# Patient Record
Sex: FEMALE | Race: ASIAN | NOT HISPANIC OR LATINO | Employment: UNEMPLOYED | URBAN - METROPOLITAN AREA
[De-identification: names, ages, dates, MRNs, and addresses within clinical notes are randomized per-mention and may not be internally consistent; named-entity substitution may affect disease eponyms.]

---

## 2017-01-02 ENCOUNTER — OFFICE VISIT (OUTPATIENT)
Dept: FAMILY MEDICINE | Facility: CLINIC | Age: 47
End: 2017-01-02

## 2017-01-02 VITALS
OXYGEN SATURATION: 97 % | HEART RATE: 74 BPM | DIASTOLIC BLOOD PRESSURE: 74 MMHG | WEIGHT: 147.4 LBS | TEMPERATURE: 98.2 F | SYSTOLIC BLOOD PRESSURE: 114 MMHG | BODY MASS INDEX: 25.48 KG/M2

## 2017-01-02 DIAGNOSIS — K21.9 GASTROESOPHAGEAL REFLUX DISEASE WITHOUT ESOPHAGITIS: Primary | ICD-10-CM

## 2017-01-02 RX ORDER — CALCIUM CARBONATE 500 MG/1
1 TABLET, CHEWABLE ORAL DAILY
Qty: 150 TABLET | Refills: 3 | Status: SHIPPED | OUTPATIENT
Start: 2017-01-02 | End: 2017-01-06

## 2017-01-02 NOTE — NURSING NOTE
name: Josh (Xavier) Kelly  Language: Danna  Agency: [employer]  Phone number: [phone]

## 2017-01-02 NOTE — PATIENT INSTRUCTIONS
Take the TUMS before you eat instead of after.    Continue with the stool softener.  Okay to take 2.    Continue with fruits and vegetables.  Rejoin the gym.

## 2017-01-02 NOTE — MR AVS SNAPSHOT
After Visit Summary   1/2/2017    Saman Enrique    MRN: [id number]           Patient Information     Date Of Birth          1970        Visit Information        Provider Department      1/2/2017 2:10 PM Nellie Harman MD [facility]        Today's Diagnoses     Gastroesophageal reflux disease without esophagitis    -  1       Care Instructions    Take the TUMS before you eat instead of after.    Continue with the stool softener.  Okay to take 2.    Continue with fruits and vegetables.  Rejoin the gym.          Follow-ups after your visit        Who to contact     Please call your clinic at [phone] to:    Ask questions about your health    Make or cancel appointments    Discuss your medicines    Learn about your test results    Speak to your doctor   If you have compliments or concerns about an experience at your clinic, or if you wish to file a complaint, please contact [facility] Patient Relations at [phone] or email us at [email or web address]         Additional Information About Your Visit        Your Vitals Were     Pulse Temperature Pulse Oximetry Last Period          74 98.2  F (36.8  C) (Oral) 97% 01/18/2015 (Approximate)         Blood Pressure from Last 3 Encounters:   01/02/17 114/74   10/20/16 110/72   08/17/16 126/81    Weight from Last 3 Encounters:   01/02/17 147 lb 6.4 oz (66.86 kg)   10/20/16 144 lb 6.4 oz (65.499 kg)   08/17/16 145 lb 6.4 oz (65.953 kg)              Today, you had the following     No orders found for display         Today's Medication Changes          These changes are accurate as of: 1/2/17  2:37 PM.  If you have any questions, ask your nurse or doctor.               Stop taking these medicines if you haven't already. Please contact your care team if you have questions.     Simethicone 125 MG Caps   Stopped by:  Nellie Harman MD                Where to get your medicines      These medications were sent to  [facility] - [place], [place] - [address], [place] [address]     Phone:  [phone]    - calcium carbonate 500 MG chewable tablet             Primary Care Provider Office Phone # Fax #    Nellie Harman [phone]       [facility] [address] [place] [address]        Thank you!     Thank you for choosing [facility]  for your care. Our goal is always to provide you with excellent care. Hearing back from our patients is one way we can continue to improve our services. Please take a few minutes to complete the written survey that you may receive in the mail after your visit with us. Thank you!             Your Updated Medication List - Protect others around you: Learn how to safely use, store and throw away your medicines at www.disposemymeds.org.          This list is accurate as of: 1/2/17  2:37 PM.  Always use your most recent med list.                   Brand Name Dispense Instructions for use    acetaminophen 325 MG tablet    TYLENOL    90 tablet    Take 1-2 tablets (325-650 mg) by mouth every 6 hours as needed for pain       calcium carbonate 500 MG chewable tablet    TUMS    150 tablet    Take 1 tablet (500 mg) by mouth daily       naproxen 375 MG tablet    NAPROSYN    60 tablet    Take 1 tablet (375 mg) by mouth 2 times daily (with meals) Take together with ranitidine       ranitidine 150 MG tablet    ZANTAC    60 tablet    Take 2 tablets (300 mg) by mouth At Bedtime Take 2 tablets daily with naproxen       senna 8.6 MG tablet    SENOKOT    60 tablet    Take 1 tablet by mouth 2 times daily       vitamin D 2000 UNITS tablet     100 tablet    Take 2,000 Units by mouth daily

## 2017-01-03 NOTE — PROGRESS NOTES
SUBJECTIVE:  This is a 47-year-old female who is seen today with the assistance of an , Josh Mendoza, for evaluation of continued abdominal pain.  She says the pain in her right side is somewhat better.  She still gets pain on the right side in the lower back, but she feels that the medication helps.  She can't remember which medication it is, but she says it is a small, red-brown pill, which sounds most likely to be naproxen.  She continues to feel that her abdomen is bloated and has too much gas.  This is mostly on the right side, but sometimes it is on the left.  She feels she is always taking medications.  She has to take the gas and burning medications on a regular basis.  Right now, she takes one stool softener (which appears to be Senna) and one calcium carbonate daily.  She also takes this if she has trouble with heartburn.  She says symptoms are particularly bothersome with cilantro and spicy foods.  She drinks water with lime or lemon in it and this helps to calm the symptoms.  She feel like she shouldn't have to take as many medications.  She worries that she could have cancer or something else that causes the discomfort.       She eats lots of fruits and vegetables to help with symptoms.  They discontinued their gym membership because of cost, but she is interested in getting into a lower cost gym option, as she feels better and has improvement in the symptoms when she exercises.     OBJECTIVE:   VITAL SIGNS:  Reviewed.  They are within the normal range.   GENERAL:  Comfortable-appearing, Danna female in no acute distress.   ABDOMEN:  Belly is soft and nontender with palpation.  Bowel sounds are present and normoactive.  No CVA tenderness.     ASSESSMENT AND PLAN:  A 47-year-old female presenting for followup of abdominal pain and discomfort.  I continue to think this is more of a functional abdominal pain.  She'll benefit from healthy eating and exercise.  It is okay to increase the Tums tablets  and Senna, to twice daily as these are helpful.   Right upper quadrant pain.  This is most likely secondary to the lipoma, although it is still an odd presentation.  The naproxen is helpful.  I don't think there is anything more concerning going on and it doesn't interfere with function.  We'll continue to monitor symptoms.     Nellie Harman

## 2017-01-06 ENCOUNTER — TELEPHONE (OUTPATIENT)
Dept: FAMILY MEDICINE | Facility: CLINIC | Age: 47
End: 2017-01-06

## 2017-01-06 DIAGNOSIS — K21.9 GASTROESOPHAGEAL REFLUX DISEASE WITHOUT ESOPHAGITIS: Primary | ICD-10-CM

## 2017-01-06 RX ORDER — CALCIUM CARBONATE 500 MG/1
1 TABLET, CHEWABLE ORAL DAILY
Qty: 150 TABLET | Refills: 3 | Status: SHIPPED | OUTPATIENT
Start: 2017-01-06 | End: 2017-01-17

## 2017-01-17 DIAGNOSIS — K21.9 GASTROESOPHAGEAL REFLUX DISEASE WITHOUT ESOPHAGITIS: Primary | ICD-10-CM

## 2017-01-17 RX ORDER — CALCIUM CARBONATE 500 MG/1
1 TABLET, CHEWABLE ORAL 2 TIMES DAILY
Qty: 180 TABLET | Refills: 3 | Status: SHIPPED | OUTPATIENT
Start: 2017-01-17 | End: 2018-06-25

## 2017-01-20 DIAGNOSIS — G56.01 CARPAL TUNNEL SYNDROME OF RIGHT WRIST: Primary | ICD-10-CM

## 2017-03-23 DIAGNOSIS — K59.00 CONSTIPATION, UNSPECIFIED CONSTIPATION TYPE: ICD-10-CM

## 2017-03-23 RX ORDER — SENNOSIDES A AND B 8.6 MG/1
1 TABLET, FILM COATED ORAL 2 TIMES DAILY
Qty: 60 TABLET | Refills: 3 | Status: SHIPPED | OUTPATIENT
Start: 2017-03-23 | End: 2017-05-18

## 2017-03-28 ENCOUNTER — RECORDS - HEALTHEAST (OUTPATIENT)
Dept: MAMMOGRAPHY | Facility: CLINIC | Age: 47
End: 2017-03-28

## 2017-03-28 DIAGNOSIS — Z12.31 ENCOUNTER FOR SCREENING MAMMOGRAM FOR MALIGNANT NEOPLASM OF BREAST: ICD-10-CM

## 2017-05-18 ENCOUNTER — OFFICE VISIT (OUTPATIENT)
Dept: FAMILY MEDICINE | Facility: CLINIC | Age: 47
End: 2017-05-18

## 2017-05-18 VITALS
WEIGHT: 149 LBS | DIASTOLIC BLOOD PRESSURE: 78 MMHG | SYSTOLIC BLOOD PRESSURE: 117 MMHG | BODY MASS INDEX: 25.44 KG/M2 | HEART RATE: 79 BPM | HEIGHT: 64 IN

## 2017-05-18 DIAGNOSIS — S93.431A SPRAIN OF TIBIOFIBULAR LIGAMENT OF RIGHT ANKLE, INITIAL ENCOUNTER: Primary | ICD-10-CM

## 2017-05-18 DIAGNOSIS — G89.29 CHRONIC RIGHT SHOULDER PAIN: ICD-10-CM

## 2017-05-18 DIAGNOSIS — M25.511 CHRONIC RIGHT SHOULDER PAIN: ICD-10-CM

## 2017-05-18 DIAGNOSIS — K59.00 CONSTIPATION, UNSPECIFIED CONSTIPATION TYPE: ICD-10-CM

## 2017-05-18 RX ORDER — SENNOSIDES A AND B 8.6 MG/1
1 TABLET, FILM COATED ORAL 2 TIMES DAILY
Qty: 60 TABLET | Refills: 5 | Status: SHIPPED | OUTPATIENT
Start: 2017-05-18 | End: 2018-06-25

## 2017-05-18 NOTE — PATIENT INSTRUCTIONS
Use the ice pack on the ankle and back.    Use the ace wrap on the ankle when moving around and at night.    Wear good supportive shoes!  Keep the ankle and the right shoulder moving--will help with stiffness.    Topical cream.  Put on ankle and shoulder blade up to 4x per day as needed.

## 2017-05-18 NOTE — PROGRESS NOTES
"    Nursing Notes:   Aruna, Whitley A, [facility]  5/18/2017 10:09 AM  Signed   name: Josh Mendoza (Htoo)  Language: Danna  Agency: [employer]  Phone number: [phone]  Chief Complaint   Patient presents with     RECHECK     Followup     Blood pressure 117/78, pulse 79, height 5' 3.75\" (161.9 cm), weight 149 lb (67.6 kg), last menstrual period 01/18/2015.    SUBJECTIVE:  This is a 47-year-old female, well-known to me, w/history of chronic right-sided body, neck, and arm pain, intermittent abnormal thyroid, fatty liver disease, and vitamin D deficiency.  She reports that she has good days and bad days.  She continues to have right-sided tightness and tenderness.  She tries to do exercises to keep the shoulder and neck moving because she is afraid it will get worse.  She worries that this will turn into a stroke.  She gets massage at home and this is helpful.  She also takes some Tylenol and uses some tiger balm, and that has been somewhat helpful for her.  We discussed further options today and she would like to try a different cream, but also she would be interested in trying OMT for this.     She is having acute right ankle pain.  She had an episode a couple of days ago where she was walking with a bunch of laundry in her hand and she missed a step.  She felt her right ankle invert and she had pain initially with that.  Now, it is uncomfortable when she is walking.  Movement is difficult.  She has had some bruising and swelling as well.  She has been using tiger balm and Tylenol for this.   Bowels have been better.  She is using Senna, two pills daily in the morning, and it keeps her stools soft.  She was on this today.     Senna has been helping with her constipation.  No loose stools.  No bleeding.  Less abdominal pain when stools are normal.      OBJECTIVE:   VITAL SIGNS:  Reviewed.  Blood pressure is within the normal range.  Weight is stable.   GENERAL:  Comfortable-appearing, Danna female in no acute " distress.   ABDOMEN:  Belly is soft, nontender, and nondistended.   EXTREMITIES:  Right ankle has some mild swelling.  No warmth.  Bruising present just below the lateral malleolus, as well as along the heel.  ROM is normal.  No instability.  No pain with palpation over the medial or lateral malleolus.  No pain with foot squeeze or calf squeeze.  Negative for corner x-ray, per [place] ankle rules.   MUSCULOSKELETAL:  She has right-sided paraspinous muscle tenderness, as well as tenderness underneath the left shoulder blade.  This extends up into the strap muscles of the neck and down toward the deltoid and extending toward her shoulder.     ASSESSMENT/PLAN:  This is a 47-year-old female presenting for routine followup, as well as acute ankle injury.   1.  Right-sided ankle sprain.  She is tender over the atlf joint.  There is mild swelling.  I gave her an Ace wrap and recommended regular ice.  She can continue to take Tylenol and use tiger balm.  She would like a different topical, so I also prescribed Voltaren gel.  We've been trying to avoid an NSAID, as it irritates her stomach.  I recommended doing ROM exercises.   2.  Chronic right-sided upper back and shoulder pain.  This continues to cause her some trouble, but it isn't limiting her activities.  There is paraspinous muscle tenderness on exam.  We'll refer her for OMT.  Continue using Tylenol.  Continue to use Voltaren on this area, as well as the ice pack.   3.  Chronic constipation.  I refilled her Senna.     She'll follow up with me in two months for recheck.  We'll also schedule OMT visit with one of our osteopathic providers here in clinic.     Nellie Harman          Patient Instructions   Use the ice pack on the ankle and back.    Use the ace wrap on the ankle when moving around and at night.    Wear good supportive shoes!  Keep the ankle and the right shoulder moving--will help with stiffness.    Topical cream.  Put on ankle and shoulder blade up to  4x per day as needed.

## 2017-05-18 NOTE — MR AVS SNAPSHOT
After Visit Summary   2017    Saman Enrique    MRN: [id number]           Patient Information     Date Of Birth          1970        Visit Information        Provider Department      2017 10:00 AM Nellie Harman MD [facility]        Today's Diagnoses     Sprain of tibiofibular ligament of right ankle, initial encounter    -  1    Constipation, unspecified constipation type          Care Instructions    Use the ice pack on the ankle and back.    Use the ace wrap on the ankle when moving around and at night.    Wear good supportive shoes!  Keep the ankle and the right shoulder moving--will help with stiffness.    Topical cream.  Put on ankle and shoulder blade up to 4x per day as needed.          Follow-ups after your visit        Who to contact     Please call your clinic at [phone] to:    Ask questions about your health    Make or cancel appointments    Discuss your medicines    Learn about your test results    Speak to your doctor   If you have compliments or concerns about an experience at your clinic, or if you wish to file a complaint, please contact [facility] Patient Relations at [phone] or email us at [email or web address]         Additional Information About Your Visit        MyChart Information     P[employer] is an electronic gateway that provides easy, online access to your medical records. With [employer], you can request a clinic appointment, read your test results, renew a prescription or communicate with your care team.     To sign up for [employer] visit the website at [email or web address]   You will be asked to enter the access code listed below, as well as some personal information. Please follow the directions to create your username and password.     Your access code is: [email or web address]  Expires: 2017 10:30 AM     Your access code will  in 90 days. If you need help or a new code, please contact your [facility]  [facility] [facility] or call [phone] for assistance.        Care EveryWhere ID     This is your Care EveryWhere ID. This could be used by other organizations to access your [place] medical records  [id number]        Your Vitals Were     Pulse Height Last Period BMI (Body Mass Index)          79 5' 3.75\" (161.9 cm) 01/18/2015 (Approximate) 25.78 kg/m2         Blood Pressure from Last 3 Encounters:   05/18/17 117/78   01/02/17 114/74   10/20/16 110/72    Weight from Last 3 Encounters:   05/18/17 149 lb (67.6 kg)   01/02/17 147 lb 6.4 oz (66.9 kg)   10/20/16 144 lb 6.4 oz (65.5 kg)              We Performed the Following     ACE WRAP          Today's Medication Changes          These changes are accurate as of: 5/18/17 10:30 AM.  If you have any questions, ask your nurse or doctor.               Start taking these medicines.        Dose/Directions    diclofenac 1 % Gel topical gel   Commonly known as:  VOLTAREN   Used for:  Sprain of tibiofibular ligament of right ankle, initial encounter   Started by:  Nellie Harman MD        Apply 4 grams to right ankle four times daily using enclosed dosing card.   Quantity:  100 g   Refills:  1            Where to get your medicines      These [facility] [place], [place] - [address], [place] [address]     Phone:  [phone]     diclofenac 1 % Gel topical gel    senna 8.6 MG tablet                Primary Care Provider Office Phone # Fax #    Nellie Harman [phone]       [facility] [address] [place] [address]        Thank you!     Thank you for choosing [facility]  for your care. Our goal is always to provide you with excellent care. Hearing back from our patients is one way we can continue to improve our services. Please take a few minutes to complete the written survey that you may receive in the mail after your visit with us. Thank you!             Your " Updated Medication List - Protect others around you: Learn how to safely use, store and throw away your medicines at www.disposemymeds.org.          This list is accurate as of: 5/18/17 10:30 AM.  Always use your most recent med list.                   Brand Name Dispense Instructions for use    acetaminophen 325 MG tablet    TYLENOL    90 tablet    Take 1-2 tablets (325-650 mg) by mouth every 6 hours as needed for pain       calcium carbonate 500 MG chewable tablet    TUMS    180 tablet    Take 1 tablet (500 mg) by mouth 2 times daily       diclofenac 1 % Gel topical gel    VOLTAREN    100 g    Apply 4 grams to right ankle four times daily using enclosed dosing card.       GABAPENTIN PO          naproxen 375 MG tablet    NAPROSYN    60 tablet    Take 1 tablet (375 mg) by mouth 2 times daily (with meals) Take together with ranitidine       ranitidine 150 MG tablet    ZANTAC    60 tablet    Take 2 tablets (300 mg) by mouth At Bedtime Take 2 tablets daily with naproxen       senna 8.6 MG tablet    SENOKOT    60 tablet    Take 1 tablet by mouth 2 times daily       vitamin D 2000 UNITS tablet     100 tablet    Take 2,000 Units by mouth daily

## 2017-09-10 ENCOUNTER — HEALTH MAINTENANCE LETTER (OUTPATIENT)
Age: 47
End: 2017-09-10

## 2017-10-09 ENCOUNTER — COMMUNICATION - HEALTHEAST (OUTPATIENT)
Dept: ADMINISTRATIVE | Facility: CLINIC | Age: 47
End: 2017-10-09

## 2017-10-11 ENCOUNTER — AMBULATORY - HEALTHEAST (OUTPATIENT)
Dept: REGISTRATION | Facility: CLINIC | Age: 47
End: 2017-10-11

## 2017-12-01 ENCOUNTER — ALLIED HEALTH/NURSE VISIT (OUTPATIENT)
Dept: FAMILY MEDICINE | Facility: CLINIC | Age: 47
End: 2017-12-01

## 2017-12-01 DIAGNOSIS — Z23 NEED FOR IMMUNIZATION AGAINST INFLUENZA: Primary | ICD-10-CM

## 2017-12-01 NOTE — MR AVS SNAPSHOT
After Visit Summary   2017    Saman Enrique    MRN: [id number]           Patient Information     Date Of Birth          1970        Visit Information        Provider Department      2017 3:10 PM [facility]        Today's Diagnoses     Need for immunization against influenza    -  1       Follow-ups after your visit        Who to contact     Please call your clinic at [phone] to:    Ask questions about your health    Make or cancel appointments    Discuss your medicines    Learn about your test results    Speak to your doctor   If you have compliments or concerns about an experience at your clinic, or if you wish to file a complaint, please contact [facility] Patient Relations at [phone] or email us at [email or web address]         Additional Information About Your Visit        MyChart Information     Lingoda is an electronic gateway that provides easy, online access to your medical records. With [employer], you can request a clinic appointment, read your test results, renew a prescription or communicate with your care team.     To sign up for [employer] visit the website at [email or web address]   You will be asked to enter the access code listed below, as well as some personal information. Please follow the directions to create your username and password.     Your access code is: [email or web address]  Expires: 3/1/2018  4:31 PM     Your access code will  in 90 days. If you need help or a new code, please contact your [facility] or call [phone] for assistance.        Care EveryWhere ID     This is your Care EveryWhere ID. This could be used by other organizations to access your [place] medical records  [id number]        Your Vitals Were     Last Period                   2015 (Approximate)            Blood Pressure from Last 3 Encounters:   17 117/78   17 114/74   10/20/16  110/72    Weight from Last 3 Encounters:   05/18/17 149 lb (67.6 kg)   01/02/17 147 lb 6.4 oz (66.9 kg)   10/20/16 144 lb 6.4 oz (65.5 kg)              We Performed the Following     ADMIN VACCINE, INITIAL     FLU VAC QUADRIVLENT SPLIT VIRUS IM 0.5ml dosage        Primary Care Provider Office Phone # Fax #    Nellie Harman [phone]       [facility] [address] [place] [address]        Equal Access to Services     MORRO NUGENT : Hadii aad ku hadasho Soomaali, waaxda luqadaha, qaybta kaalmada adeegyada, waxay amanda hayravi wolfe . So [facility] [place] [facility] [phone].    ATENCIÓN: Si habla español, tiene a miranda disposición servicios gratuitos de asistencia lingüística. Llame al [phone].    We comply with applicable federal civil rights laws and [place] laws. We do not discriminate on the basis of race, color, national origin, age, disability, sex, sexual orientation, or gender identity.            Thank you!     Thank you for choosing [facility]  for your care. Our goal is always to provide you with excellent care. Hearing back from our patients is one way we can continue to improve our services. Please take a few minutes to complete the written survey that you may receive in the mail after your visit with us. Thank you!             Your Updated Medication List - Protect others around you: Learn how to safely use, store and throw away your medicines at www.disposemymeds.org.          This list is accurate as of: 12/1/17  4:31 PM.  Always use your most recent med list.                   Brand Name Dispense Instructions for use Diagnosis    acetaminophen 325 MG tablet    TYLENOL    90 tablet    Take 1-2 tablets (325-650 mg) by mouth every 6 hours as needed for pain    Preventative health care       calcium carbonate 500 MG chewable tablet    TUMS    180 tablet    Take 1 tablet (500 mg) by mouth 2 times daily    Gastroesophageal reflux disease without esophagitis        capsaicin 0.035 % Crea     1 Tube    Externally apply 1 Application topically 4 times daily as needed    Chronic right shoulder pain       GABAPENTIN PO           naproxen 375 MG tablet    NAPROSYN    60 tablet    Take 1 tablet (375 mg) by mouth 2 times daily (with meals) Take together with ranitidine    Carpal tunnel syndrome of right wrist       ranitidine 150 MG tablet    ZANTAC    60 tablet    Take 2 tablets (300 mg) by mouth At Bedtime Take 2 tablets daily with naproxen    Gastroesophageal reflux disease, esophagitis presence not specified       senna 8.6 MG tablet    SENOKOT    60 tablet    Take 1 tablet by mouth 2 times daily    Constipation, unspecified constipation type       vitamin D 2000 UNITS tablet     100 tablet    Take 2,000 Units by mouth daily    Preventative health care

## 2018-06-25 ENCOUNTER — RECORDS - HEALTHEAST (OUTPATIENT)
Dept: ADMINISTRATIVE | Facility: OTHER | Age: 48
End: 2018-06-25

## 2018-06-25 ENCOUNTER — OFFICE VISIT (OUTPATIENT)
Dept: FAMILY MEDICINE | Facility: CLINIC | Age: 48
End: 2018-06-25
Payer: COMMERCIAL

## 2018-06-25 VITALS
BODY MASS INDEX: 25.85 KG/M2 | TEMPERATURE: 98.5 F | DIASTOLIC BLOOD PRESSURE: 76 MMHG | OXYGEN SATURATION: 99 % | HEART RATE: 68 BPM | WEIGHT: 149.4 LBS | SYSTOLIC BLOOD PRESSURE: 113 MMHG

## 2018-06-25 DIAGNOSIS — R52 PAIN OF RIGHT SIDE OF BODY: Primary | ICD-10-CM

## 2018-06-25 DIAGNOSIS — K59.00 CONSTIPATION, UNSPECIFIED CONSTIPATION TYPE: ICD-10-CM

## 2018-06-25 DIAGNOSIS — K21.9 GASTROESOPHAGEAL REFLUX DISEASE, ESOPHAGITIS PRESENCE NOT SPECIFIED: ICD-10-CM

## 2018-06-25 DIAGNOSIS — K76.0 HEPATIC STEATOSIS: ICD-10-CM

## 2018-06-25 LAB
% GRANULOCYTES: 59.4 %G (ref 40–75)
ALBUMIN SERPL-MCNC: 4.2 MG/DL (ref 3.9–5.1)
ALP SERPL-CCNC: 90.7 U/L (ref 40–150)
ALT SERPL-CCNC: <15 U/L (ref 0–45)
AST SERPL-CCNC: 24.8 U/L (ref 0–45)
BILIRUB SERPL-MCNC: 0.3 MG/DL (ref 0.2–1.3)
BILIRUBIN DIRECT: 0.1 MG/DL (ref 0–0.2)
GRANULOCYTES #: 3.8 K/UL (ref 1.6–8.3)
HCT VFR BLD AUTO: 40.7 % (ref 35–47)
HEMOGLOBIN: 12.7 G/DL (ref 11.7–15.7)
LIPASE SERPL-CCNC: 38 U/L (ref 0–52)
LYMPHOCYTES # BLD AUTO: 2.1 K/UL (ref 0.8–5.3)
LYMPHOCYTES NFR BLD AUTO: 32.2 %L (ref 20–48)
MCH RBC QN AUTO: 30.1 PG (ref 26.5–35)
MCHC RBC AUTO-ENTMCNC: 31.2 G/DL (ref 32–36)
MCV RBC AUTO: 96.4 FL (ref 78–100)
MID #: 0.5 K/UL (ref 0–2.2)
MID %: 8.4 %M (ref 0–20)
PLATELET # BLD AUTO: 255 K/UL (ref 150–450)
PROT SERPL-MCNC: 7 G/DL (ref 6.8–8.8)
RBC # BLD AUTO: 4.2 M/UL (ref 3.8–5.2)
WBC # BLD AUTO: 6.4 K/UL (ref 4–11)

## 2018-06-25 RX ORDER — ACETAMINOPHEN 325 MG/1
650 TABLET ORAL EVERY 6 HOURS PRN
Qty: 100 TABLET | Refills: 0 | Status: SHIPPED | OUTPATIENT
Start: 2018-06-25 | End: 2019-11-19

## 2018-06-25 RX ORDER — POLYETHYLENE GLYCOL 3350 17 G/17G
1 POWDER, FOR SOLUTION ORAL DAILY
Qty: 510 G | Refills: 1 | Status: SHIPPED | OUTPATIENT
Start: 2018-06-25 | End: 2019-11-19

## 2018-06-25 NOTE — NURSING NOTE
Due to patient being non-English speaking/uses sign language, an  was used for this visit. Only for face-to-face interpretation by an external agency, date and length of interpretation can be found on the scanned worksheet.     name: Lety Rooney  Agency: [employer]  Language: Danna   Telephone number: [phone]  Type of interpretation: Face-to-face, spoken

## 2018-06-25 NOTE — PROGRESS NOTES
Subjective:    Saman Enrique is a 48 year old female who presents for evaluation of pain on the right side of her body x 2 weeks. She describes this as a burning sensation. It is located from head to toe. She can't think of any area that it originates from. She thinks this started about two years ago and it comes and goes. She can't think of any inciting event or injury. She has tried different medications over the years for this and thinks naproxen has worked the best. She stopped taking it because the pain got better. She didn't realize it was something you could take on an ongoing basis. She denies any weakness or difficulty walking. She has not noticed any rashes.     She also notes epigastric and RUQ abdominal pain. This has also been going on for a few years. She was on some medications for GERD and constipation she says, but again since they improved her symptoms she stopped them. She is not currently taking any medications at all. She notes regular stools, but says they are harder then she would like so wonders if she could be constipated.    ROS:   General: Denies fevers, chills.  Skin: Denies new rashes or lesions.  HEENT: +headache.  MSK: +pain right side of body.    Objective:    /76  Pulse 68  Temp 98.5  F (36.9  C) (Oral)  Wt 149 lb 6.4 oz (67.8 kg)  LMP 01/18/2015 (Approximate)  SpO2 99%  BMI 25.85 kg/m2    Physical Exam:  General: NAD.  Skin: No rashes or lesions visualized.  HEENT: PERRLA, EOMI.  Heart: Regular rhythm, no murmurs.  Lungs: Clear to auscultation b/l, no wheezes, crackles, or rales.  Abdomen: No distension. No tenderness to palpation. Negative green's sign. No hepatomegaly on palpation.   MSK: Normal gait.  Lymphatic: No swelling seen.  Neurologic: CN II-XII grossly intact, +2/4 reflexes bilaterally, symmetric sensation, 5/5 muscle strength bilaterally.    Assessment/Plan:    Saman was seen today for pain, headache and medication reconciliation.    Diagnoses and all orders for this  visit:    Pain of right side of body  Hard to know exactly what is causing this pain, odd that it is the entire right side of her body. She has no tenderness to palpation of her spine, but I do wonder about a cervical disc or nerve root compression. Will get an MRI cervical spine. Naproxen has worked well in the past, but with possible gastritis/GERD will switch to tylenol, patient is comfortable with this.      Gastroesophageal reflux disease, esophagitis presence not specified  Hepatic steatosis  Abdominal pain  Constipation  History of GERD, will place back on ranitidine. Also has has a history of BRANDON seen on US in 2014, will get a repeat RUQ ultrasound to evaluate for progression. Will try miralax for constipation. Lastly, will get some basic labs including CBC, lipase and hepatic profile for further evaluation. Return in 2 weeks for follow up.    Haley Henderson, PGY 3  Family Medicine Resident  [facility]

## 2018-06-25 NOTE — PATIENT INSTRUCTIONS
Go to lab before you leave.    Schedule MRI of neck and ultrasound of your abdomen.    Try miralax daily and tylenol as needed.    Return in 2 weeks to follow up with your primary, Dr. Harman.    MRI CERVICAL SPINE W/O CONTRAST  US ABDOMEN LIMITED  June 26, 2018 @ 1:00 pm was advised by  that Po is unable to make the appointments on 6/29/18 and would like to be rescheduled to 7/2/18 or 7/3/18   name: Bridger Rooney  Language: Danna  Agency: [employer]  Phone number: [phone]    UPDATED APPOINTMENT INFORMATION    Appointment:  Monday 7/2/18  Arrival Time:  6:45 [place] [address]    Nothing to eat or drink 8 hours prior to your exam    You will have your Ultrasound followed by your MRI    A Danna  will be requested by [facility] Scheduling for you    Order faxed to [phone]    Information given to  Kenny Rooney who will update patient     Blue Ride Scheduled with   [facility]  [phone]  Jul 2  from Home (5:45 am)  to [facility] ([place])

## 2018-06-25 NOTE — MR AVS SNAPSHOT
After Visit Summary   2018    Saman Enrique    MRN: [id number]           Patient Information     Date Of Birth          1970        Visit Information        Provider Department      2018 2:30 PM Haley Henderson DO [facility]        Today's Diagnoses     Burning pain    -  1    Gastroesophageal reflux disease, esophagitis presence not specified        Hepatic steatosis        Constipation, unspecified constipation type          Care Instructions    Go to lab before you leave.    Schedule MRI of neck and ultrasound of your abdomen.    Try miralax daily and tylenol as needed.    Return in 2 weeks to follow up with your primary, Dr. Harman.          Follow-ups after your visit        Future tests that were ordered for you today     Open Future Orders        Priority Expected Expires Ordered    MRI CERVICAL SPINE W/O CONTRAST Routine  2019    US ABDOMEN LIMITED Routine  2019            Who to contact     Please call your clinic at [phone] to:    Ask questions about your health    Make or cancel appointments    Discuss your medicines    Learn about your test results    Speak to your doctor            Additional Information About Your Visit        MyChart Information     [employer] is an electronic gateway that provides easy, online access to your medical records. With [employer], you can request a clinic appointment, read your test results, renew a prescription or communicate with your care team.     To sign up for [employer] visit the website at [email or web address]   You will be asked to enter the access code listed below, as well as some personal information. Please follow the directions to create your username and password.     Your access code is: [email or web address]  Expires: 2018  3:20 PM     Your access code will  in 90 days. If you need help or a new code, please contact your [facility] or call [phone]  for assistance.        Care EveryWhere ID     This is your Care EveryWhere ID. This could be used by other organizations to access your [place] medical records  [id number]        Your Vitals Were     Pulse Temperature Last Period Pulse Oximetry BMI (Body Mass Index)       68 98.5  F (36.9  C) (Oral) 01/18/2015 (Approximate) 99% 25.85 kg/m2        Blood Pressure from Last 3 Encounters:   06/25/18 113/76   05/18/17 117/78   01/02/17 114/74    Weight from Last 3 Encounters:   06/25/18 149 lb 6.4 oz (67.8 kg)   05/18/17 149 lb (67.6 kg)   01/02/17 147 lb 6.4 oz (66.9 kg)              We Performed the Following     CBC with Diff Plt ([facility])     Hepatic Panel ([facility])     Lipase ([facility])          Today's Medication Changes          These changes are accurate as of 6/25/18  3:20 PM.  If you have any questions, ask your nurse or doctor.               Start taking these medicines.        Dose/Directions    acetaminophen 325 MG tablet   Commonly known as:  TYLENOL   Used for:  Burning pain   Started by:  Haley Henderson DO        Dose:  650 mg   Take 2 tablets (650 mg) by mouth every 6 hours as needed for mild pain   Quantity:  100 tablet   Refills:  0       polyethylene glycol powder   Commonly known as:  MIRALAX   Used for:  Constipation, unspecified constipation type   Started by:  Haley Henderson DO        Dose:  1 capful   Take 17 g (1 capful) by mouth daily   Quantity:  510 g   Refills:  1            Where to get your medicines      These medications were sent to [facility] - [place], [place] - [address], [place] [address]     Phone:  [phone]     acetaminophen 325 MG tablet    polyethylene glycol powder    ranitidine 150 MG tablet                Primary Care Provider Office Phone # Fax #    Nellie Harman [phone]       [address] [place] [address]        Equal Access to Services     MORRO NUGENT AH: Grazyna mayer  bernice Lincoln, yocasta cordero, qadaijata kagauri hemalathachina, yessica ambarin hayaaloreta pennybritney trungvalentinapietro beMichaelravi nidia. So [facility] [phone].    ATENCIÓN: Si habla lastañol, tiene a miranda disposición servicios gratuitos de asistencia lingüística. Ochoa al [phone].    We comply with applicable federal civil rights laws and [place] laws. We do not discriminate on the basis of race, color, national origin, age, disability, sex, sexual orientation, or gender identity.            Thank you!     Thank you for choosing [facility]  for your care. Our goal is always to provide you with excellent care. Hearing back from our patients is one way we can continue to improve our services. Please take a few minutes to complete the written survey that you may receive in the mail after your visit with us. Thank you!             Your Updated Medication List - Protect others around you: Learn how to safely use, store and throw away your medicines at www.disposemymeds.org.          This list is accurate as of 6/25/18  3:20 PM.  Always use your most recent med list.                   Brand Name Dispense Instructions for use Diagnosis    acetaminophen 325 MG tablet    TYLENOL    100 tablet    Take 2 tablets (650 mg) by mouth every 6 hours as needed for mild pain    Burning pain       naproxen 375 MG tablet    NAPROSYN    60 tablet    Take 1 tablet (375 mg) by mouth 2 times daily (with meals) Take together with ranitidine    Carpal tunnel syndrome of right wrist       polyethylene glycol powder    MIRALAX    510 g    Take 17 g (1 capful) by mouth daily    Constipation, unspecified constipation type       ranitidine 150 MG tablet    ZANTAC    60 tablet    Take 2 tablets (300 mg) by mouth At Bedtime Take 2 tablets daily with naproxen    Gastroesophageal reflux disease, esophagitis presence not specified

## 2018-06-26 NOTE — PROGRESS NOTES
Please have  contact patient and let her know that he labs were normal. When her imaging returns we will let her know, but if she doesn't hear from us she should call in to follow up. Make sure to follow up with Dr. Harman in a couple of weeks.    Haley Henderson, DO

## 2018-07-02 NOTE — PROGRESS NOTES
Preceptor Attestation:   Patient seen, evaluated and discussed with the resident. I have verified the content of the note, which accurately reflects my assessment of the patient and the plan of care.   Supervising Physician:  Cristo Paez MD

## 2018-07-17 ENCOUNTER — OFFICE VISIT (OUTPATIENT)
Dept: FAMILY MEDICINE | Facility: CLINIC | Age: 48
End: 2018-07-17
Payer: COMMERCIAL

## 2018-07-17 VITALS
HEART RATE: 85 BPM | RESPIRATION RATE: 16 BRPM | DIASTOLIC BLOOD PRESSURE: 79 MMHG | OXYGEN SATURATION: 98 % | TEMPERATURE: 98.9 F | WEIGHT: 151 LBS | SYSTOLIC BLOOD PRESSURE: 118 MMHG | BODY MASS INDEX: 26.12 KG/M2

## 2018-07-17 DIAGNOSIS — R14.2 FLATULENCE, ERUCTATION AND GAS PAIN: Primary | ICD-10-CM

## 2018-07-17 DIAGNOSIS — G89.29 CHRONIC RIGHT-SIDED THORACIC BACK PAIN: ICD-10-CM

## 2018-07-17 DIAGNOSIS — R14.1 FLATULENCE, ERUCTATION AND GAS PAIN: Primary | ICD-10-CM

## 2018-07-17 DIAGNOSIS — M54.6 CHRONIC RIGHT-SIDED THORACIC BACK PAIN: ICD-10-CM

## 2018-07-17 DIAGNOSIS — R14.3 FLATULENCE, ERUCTATION AND GAS PAIN: Primary | ICD-10-CM

## 2018-07-17 RX ORDER — SIMETHICONE 125 MG
1 CAPSULE ORAL 3 TIMES DAILY PRN
Qty: 100 CAPSULE | Refills: 3 | Status: SHIPPED | OUTPATIENT
Start: 2018-07-17 | End: 2018-10-08

## 2018-07-17 NOTE — MR AVS SNAPSHOT
After Visit Summary   2018    Saman Enrique    MRN: [id number]           Patient Information     Date Of Birth          1970        Visit Information        Provider Department      2018 1:10 PM Nellie Harman MD [facility]        Today's Diagnoses     Flatulence, eructation and gas pain    -  1      Care Instructions    Back looks good.    Okay to take the tylenol as needed.  Can also use Naproxen if needed.      Follow up at end .            Follow-ups after your visit        Your next 10 appointments already scheduled     Aug 30, 2018  1:30 PM CDT   Return Visit with Nellie Harman MD   [facility] ([facility])    [address] [place] [address]   [phone]              Who to contact     Please call your clinic at [phone] to:    Ask questions about your health    Make or cancel appointments    Discuss your medicines    Learn about your test results    Speak to your doctor            Additional Information About Your Visit        MyChart Information     [employer] is an electronic gateway that provides easy, online access to your medical records. With [employer], you can request a clinic appointment, read your test results, renew a prescription or communicate with your care team.     To sign up for [employer] visit the website at [email or web address]   You will be asked to enter the access code listed below, as well as some personal information. Please follow the directions to create your username and password.     Your access code is: [email or web address]  Expires: 2018  3:20 PM     Your access code will  in 90 days. If you need help or a new code, please contact your [facility] or call [phone] for assistance.        Care EveryWhere ID     This is your Care EveryWhere ID. This could be used by other organizations to access your [place] medical records  [id number]        Your Vitals Were     Pulse  Temperature Respirations Last Period Pulse Oximetry BMI (Body Mass Index)    85 98.9  F (37.2  C) (Oral) 16 01/18/2015 (Approximate) 98% 26.12 kg/m2       Blood Pressure from Last 3 Encounters:   07/17/18 118/79   06/25/18 113/76   05/18/17 117/78    Weight from Last 3 Encounters:   07/17/18 151 lb (68.5 kg)   06/25/18 149 lb 6.4 oz (67.8 kg)   05/18/17 149 lb (67.6 kg)              Today, you had the following     No orders found for display         Today's Medication Changes          These changes are accurate as of 7/17/18  2:15 PM.  If you have any questions, ask your nurse or doctor.               Start taking these medicines.        Dose/Directions    Simethicone 125 MG Caps   Used for:  Flatulence, eructation and gas pain   Started by:  Nellie Harman MD        Dose:  1 tablet   Take 1 tablet by mouth 3 times daily as needed For gas   Quantity:  100 capsule   Refills:  3            Where to get your medicines      These medications were sent to [facility] - [place], [place] - [address], [place] [address]     Phone:  [phone]     Simethicone 125 MG Caps                Primary Care Provider Office Phone # Fax #    Nellie Harman [phone]       [address] [place] [address]        Equal Access to Services     MORRO NUGENT AH: Hadii luis ku hadasho Soomaali, waaxda luqadaha, qaybta kaalmada adeegyada, waxay amanda hayravi wolfe . So [facility] [phone].    ATENCIÓN: Si habla español, tiene a miranda disposición servicios gratuitos de asistencia lingüística. Ochoa al [phone].    We comply with applicable federal civil rights laws and [place] laws. We do not discriminate on the basis of race, color, national origin, age, disability, sex, sexual orientation, or gender identity.            Thank you!     Thank you for choosing [facility]  for your care. Our goal is always to provide you with excellent care. Hearing back from  our patients is one way we can continue to improve our services. Please take a few minutes to complete the written survey that you may receive in the mail after your visit with us. Thank you!             Your Updated Medication List - Protect others around you: Learn how to safely use, store and throw away your medicines at www.disposemymeds.org.          This list is accurate as of 7/17/18  2:15 PM.  Always use your most recent med list.                   Brand Name Dispense Instructions for use Diagnosis    acetaminophen 325 MG tablet    TYLENOL    100 tablet    Take 2 tablets (650 mg) by mouth every 6 hours as needed for mild pain    Pain of right side of body       naproxen 375 MG tablet    NAPROSYN    60 tablet    Take 1 tablet (375 mg) by mouth 2 times daily (with meals) Take together with ranitidine    Carpal tunnel syndrome of right wrist       polyethylene glycol powder    MIRALAX    510 g    Take 17 g (1 capful) by mouth daily    Constipation, unspecified constipation type       ranitidine 150 MG tablet    ZANTAC    60 tablet    Take 2 tablets (300 mg) by mouth At Bedtime Take 2 tablets daily with naproxen    Gastroesophageal reflux disease, esophagitis presence not specified       Simethicone 125 MG Caps     100 capsule    Take 1 tablet by mouth 3 times daily as needed For gas    Flatulence, eructation and gas pain

## 2018-07-17 NOTE — PATIENT INSTRUCTIONS
Back looks good.    Okay to take the tylenol as needed.  Can also use Naproxen if needed.      Follow up at end of August.

## 2018-07-17 NOTE — NURSING NOTE
Due to patient being non-English speaking/uses sign language, an  was used for this visit. Only for face-to-face interpretation by an external agency, date and length of interpretation can be found on the scanned worksheet.     name: Kenny Rooney  Agency: [employer]  Language: Danna   Telephone number: [phone]  Type of interpretation: Face-to-face, spoken

## 2018-07-18 NOTE — PROGRESS NOTES
Nursing Notes:   Iris Looney, TRESSA  7/17/2018  1:19 PM  Signed  Due to patient being non-English speaking/uses sign language, an  was used for this visit. Only for face-to-face interpretation by an external agency, date and length of interpretation can be found on the scanned worksheet.     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     Medication Reconciliation     Complete.      Patient is here for follow-up on body pain.  Has been taking the medicine as prescribed by Dr. Henderson and the right-sided pain is feeling better.  She cannot remember what medication this is but it appears that Dr. Henderson prescribed Tylenol.  She also wants to discuss her MRI results.  We are able to see a copy of this from [facility].  It shows some minor facet arthropathy some minor disc bulging without any evidence of nerve impingement.  Changes appear to be right lateral and she has right-sided symptoms only.  Discussed that this does not appear to have anything surgical that requires invasive treatment currently.  She has no complaints of numbness tingling or pain today.    She is noting some gas pain and bloating.  Located in the midepigastric region and across the abdomen.  Has taken a soft medication in the past that has helped with  this.  I asked that this was a talking medication like Tums and she denies that medication, and thinks that it is the simethicone that helps.  This was useful in the past and she would like a refill on it.  Endorses no trouble with constipation, and is working to eat healthy get enough fiber.  Drinking water well.  Continues to exercise.  Denies symptoms of heartburn or acid reflux today.    OBJECTIVE:  Vitals: /79 (BP Location: Left arm, Patient Position: Sitting, Cuff Size: Adult Regular)  Pulse 85  Temp 98.9  F (37.2  C) (Oral)  Resp 16  Wt 151 lb (68.5 kg)  LMP 01/18/2015 (Approximate)  SpO2 98%  BMI 26.12 kg/m2  BMI= Body mass index is 26.12 kg/(m^2).  Vitals:  Vitals are reviewed and are within the normal range  Gen:  Alert, pleasant, no acute distress  Cardiac:  Regular rate and rhythm, no murmurs, rubs or gallops  Respiratory:  Lungs clear to auscultation bilaterally  Abdomen:  Soft, non-tender, non-distended, bowel sounds positive.  Very mild tenderness with deep palpation in the bilateral upper epigastric region.  No rebound or guarding.  No hepatosplenomegaly.  Extremities:  Warm, well-perfused, pulses 2+/4, no lower extremity edema      ASSESSMENT AND PLAN:      Po was seen today for follow up for and medication reconciliation.    Diagnoses and all orders for this visit:    Flatulence, eructation and gas pain.  Exam is benign.  She does have a prescription for ranitidine at home.  Has difficulty describing this medication to me however.  Will refill the simethicone per her request.  We will have her bring all her medications in for follow-up at 2 months to ensure that she is using each of them appropriately.  -     Simethicone 125 MG CAPS; Take 1 tablet by mouth 3 times daily as needed For gas  -      : Sign Language or Oral - 53-67 minutes    Chronic right-sided thoracic back pain.  Exam is benign.  Symptoms have resolved with the medication she was given last time, which have  believed to be Tylenol.  Again we will have her bring her medications in for follow-up.  Discussed the MRI results which do not suggest any significant nerve impingement.  Will continue with conservative care.  Recommended continued exercise and healthy diet.    Patient Instructions   Back looks good.    Okay to take the tylenol as needed.  Can also use Naproxen if needed.      Follow up at end of August.        Follow up in 6 weeks to review medications.      Nellie Harman

## 2018-10-08 ENCOUNTER — OFFICE VISIT (OUTPATIENT)
Dept: FAMILY MEDICINE | Facility: CLINIC | Age: 48
End: 2018-10-08
Payer: MEDICAID

## 2018-10-08 ENCOUNTER — RECORDS - HEALTHEAST (OUTPATIENT)
Dept: ADMINISTRATIVE | Facility: OTHER | Age: 48
End: 2018-10-08

## 2018-10-08 VITALS
BODY MASS INDEX: 27.09 KG/M2 | RESPIRATION RATE: 16 BRPM | SYSTOLIC BLOOD PRESSURE: 133 MMHG | TEMPERATURE: 98.2 F | DIASTOLIC BLOOD PRESSURE: 77 MMHG | HEART RATE: 88 BPM | WEIGHT: 156.6 LBS | OXYGEN SATURATION: 98 %

## 2018-10-08 DIAGNOSIS — R52 PAIN OF RIGHT SIDE OF BODY: ICD-10-CM

## 2018-10-08 DIAGNOSIS — R14.2 FLATULENCE, ERUCTATION AND GAS PAIN: ICD-10-CM

## 2018-10-08 DIAGNOSIS — K21.9 GASTROESOPHAGEAL REFLUX DISEASE, ESOPHAGITIS PRESENCE NOT SPECIFIED: ICD-10-CM

## 2018-10-08 DIAGNOSIS — G56.01 CARPAL TUNNEL SYNDROME OF RIGHT WRIST: ICD-10-CM

## 2018-10-08 DIAGNOSIS — R14.3 FLATULENCE, ERUCTATION AND GAS PAIN: ICD-10-CM

## 2018-10-08 DIAGNOSIS — R14.1 FLATULENCE, ERUCTATION AND GAS PAIN: ICD-10-CM

## 2018-10-08 DIAGNOSIS — Z23 NEED FOR IMMUNIZATION AGAINST INFLUENZA: ICD-10-CM

## 2018-10-08 DIAGNOSIS — Z00.00 PREVENTATIVE HEALTH CARE: Primary | ICD-10-CM

## 2018-10-08 DIAGNOSIS — K76.0 HEPATIC STEATOSIS: ICD-10-CM

## 2018-10-08 RX ORDER — SIMETHICONE 125 MG
1 CAPSULE ORAL 3 TIMES DAILY PRN
Qty: 100 CAPSULE | Refills: 3 | Status: SHIPPED | OUTPATIENT
Start: 2018-10-08 | End: 2019-11-19

## 2018-10-08 ASSESSMENT — ANXIETY QUESTIONNAIRES
IF YOU CHECKED OFF ANY PROBLEMS ON THIS QUESTIONNAIRE, HOW DIFFICULT HAVE THESE PROBLEMS MADE IT FOR YOU TO DO YOUR WORK, TAKE CARE OF THINGS AT HOME, OR GET ALONG WITH OTHER PEOPLE: NOT DIFFICULT AT ALL
3. WORRYING TOO MUCH ABOUT DIFFERENT THINGS: NOT AT ALL
2. NOT BEING ABLE TO STOP OR CONTROL WORRYING: NOT AT ALL
5. BEING SO RESTLESS THAT IT IS HARD TO SIT STILL: NOT AT ALL
7. FEELING AFRAID AS IF SOMETHING AWFUL MIGHT HAPPEN: NOT AT ALL
GAD7 TOTAL SCORE: 0
6. BECOMING EASILY ANNOYED OR IRRITABLE: NOT AT ALL
1. FEELING NERVOUS, ANXIOUS, OR ON EDGE: NOT AT ALL

## 2018-10-08 ASSESSMENT — PATIENT HEALTH QUESTIONNAIRE - PHQ9: 5. POOR APPETITE OR OVEREATING: NOT AT ALL

## 2018-10-08 NOTE — NURSING NOTE
Due to patient being non-English speaking/uses sign language, an  was used for this visit. Only for face-to-face interpretation by an external agency, date and length of interpretation can be found on the scanned worksheet.     name: Kenny Rooney  Agency: [employer]  Language: Danna   Telephone number: [phone]  Type of interpretation: Face-to-face, spoken

## 2018-10-08 NOTE — PATIENT INSTRUCTIONS
Refill stomach medications.      Schedule mammogram.      Schedule follow up for pap smear in next few weeks.      Go down to lab to do fit testing.      [facility]  Phone: [phone]  Fax: [phone]    [address] [address]    Appointment:  Tuesday October 17, 2018  Arrival Time: 10:00 am    Please do not wear any deodorant, powder, lotion or scented perfume the day of your appointment    Please bring a copy of your insurance card and photo ID    If you cannot make this appointment please call [phone] to reschedule    October 8, 2018 at 4:37 pm printed appointment information and given to patient at the [employer]pa desk. Order faxed to [phone]. gerda oksana

## 2018-10-08 NOTE — NURSING NOTE
Injectable influenza vaccine documentation    1. Has the patient received the information for the influenza vaccine? YES    2. Does the patient have a severe allergy to eggs (Patients with a severe egg allergy should be assessed by a medical provider, RN, or clinical pharmacist. If they receive the influenza vaccine, please have them observed for 15 minutes.)? No    3. Has the patient had an allergic reaction to previous influenza vaccines? No    4. Has the patient had any severe allergic reactions to past influenza vaccines ? No       5. Does patient have a history of Guillain-C[place] syndrome? No      Based on responses above, I administered the influenza vaccine.  Iris Looney, CMA

## 2018-10-08 NOTE — MR AVS SNAPSHOT
After Visit Summary   10/8/2018    Saman Enrique    MRN: [id number]           Patient Information     Date Of Birth          1970        Visit Information        Provider Department      10/8/2018 3:50 PM Nellie Harman MD [facility]        Today's Diagnoses     Preventative health care    -  1    Carpal tunnel syndrome of right wrist        Gastroesophageal reflux disease, esophagitis presence not specified        Flatulence, eructation and gas pain        Need for immunization against influenza          Care Instructions    Refill stomach medications.      Schedule mammogram.      Schedule follow up for pap smear in next few weeks.      Go down to lab to do fit testing.            Follow-ups after your visit        Future tests that were ordered for you today     Open Future Orders        Priority Expected Expires Ordered    Fecal Occult Blood - FIT, iFOB ([facility]) Routine  10/15/2018 10/8/2018    MA SCREENING DIGITAL BILAT Routine  10/8/2019 10/8/2018            Who to contact     Please call your clinic at [phone] to:    Ask questions about your health    Make or cancel appointments    Discuss your medicines    Learn about your test results    Speak to your doctor            Additional Information About Your Visit        MyCharHeetch Information     GoGarden is an electronic gateway that provides easy, online access to your medical records. With GoGarden, you can request a clinic appointment, read your test results, renew a prescription or communicate with your care team.     To sign up for GoGarden visit the website at [email or web address]   You will be asked to enter the access code listed below, as well as some personal information. Please follow the directions to create your username and password.     Your access code is: [email or web address]  Expires: 2019  4:23 PM     Your access code will  in 90 days. If you need help or a new code, please contact your [facility] or call [phone] for assistance.        Care EveryWhere ID     This is your Care EveryWhere ID. This could be used by other organizations to access your [place] medical records  [id number]        Your Vitals Were     Pulse Temperature Respirations Last Period Pulse Oximetry BMI (Body Mass Index)    88 98.2  F (36.8  C) (Oral) 16 01/18/2015 (Approximate) 98% 27.09 kg/m2       Blood Pressure from Last 3 Encounters:   10/08/18 133/77   07/17/18 118/79   06/25/18 113/76    Weight from Last 3 Encounters:   10/08/18 156 lb 9.6 oz (71 kg)   07/17/18 151 lb (68.5 kg)   06/25/18 149 lb 6.4 oz (67.8 kg)              We Performed the Following     ADMIN VACCINE, INITIAL     FLU VAC QUADRIVLENT SPLIT VIRUS IM 0.5ml dosage          Today's Medication Changes          These changes are accurate as of 10/8/18  4:23 PM.  If you have any questions, ask your nurse or doctor.               These medicines have changed or have updated prescriptions.        Dose/Directions    Simethicone 125 MG Caps   This may have changed:  reasons to take this   Used for:  Flatulence, eructation and gas pain   Changed by:  Nellie Harman MD        Dose:  1 tablet   Take 1 tablet by mouth 3 times daily as needed (gas or stomach pain) For gas   Quantity:  100 capsule   Refills:  3            Where to get your medicines      These medications were sent to [facility] - [place], [place] - [address], [place] [address]     Phone:  [phone]     naproxen 375 MG tablet    ranitidine 150 MG tablet    Simethicone 125 MG Caps                Primary Care Provider Office Phone # Fax #    Nellie Harman [phone]       [address] [place] [address]        Equal Access to Services     RHEA NUGENT AH: Grazyna stokeso Sosoledad, waaxda luqadaha, qaybta kaalmada adeegyada, yessica jacob. So [facility] [phone].    ATENCIÓN: Si cris naranjo,  tiene a miranda disposición servicios gratuitos de asistencia lingüística. Ochoa lynn [phone].    We comply with applicable federal civil rights laws and [place] laws. We do not discriminate on the basis of race, color, national origin, age, disability, sex, sexual orientation, or gender identity.            Thank you!     Thank you for choosing [facility]  for your care. Our goal is always to provide you with excellent care. Hearing back from our patients is one way we can continue to improve our services. Please take a few minutes to complete the written survey that you may receive in the mail after your visit with us. Thank you!             Your Updated Medication List - Protect others around you: Learn how to safely use, store and throw away your medicines at www.disposemymeds.org.          This list is accurate as of 10/8/18  4:23 PM.  Always use your most recent med list.                   Brand Name Dispense Instructions for use Diagnosis    acetaminophen 325 MG tablet    TYLENOL    100 tablet    Take 2 tablets (650 mg) by mouth every 6 hours as needed for mild pain    Pain of right side of body       naproxen 375 MG tablet    NAPROSYN    60 tablet    Take 1 tablet (375 mg) by mouth 2 times daily (with meals) Take together with ranitidine    Carpal tunnel syndrome of right wrist       polyethylene glycol powder    MIRALAX    510 g    Take 17 g (1 capful) by mouth daily    Constipation, unspecified constipation type       ranitidine 150 MG tablet    ZANTAC    60 tablet    Take 2 tablets (300 mg) by mouth At Bedtime Take 2 tablets daily with naproxen    Gastroesophageal reflux disease, esophagitis presence not specified       Simethicone 125 MG Caps     100 capsule    Take 1 tablet by mouth 3 times daily as needed (gas or stomach pain) For gas    Flatulence, eructation and gas pain

## 2018-10-09 ASSESSMENT — PATIENT HEALTH QUESTIONNAIRE - PHQ9: SUM OF ALL RESPONSES TO PHQ QUESTIONS 1-9: 1

## 2018-10-09 ASSESSMENT — ANXIETY QUESTIONNAIRES: GAD7 TOTAL SCORE: 0

## 2018-10-09 NOTE — PROGRESS NOTES
Nursing Notes:   Iris Looney CMA  10/8/2018  4:04 PM  Signed  Due to patient being non-English speaking/uses sign language, an  was used for this visit. Only for face-to-face interpretation by an external agency, date and length of interpretation can be found on the scanned worksheet.     name: Kenny Rooney  Agency: [employer]  Language: Danna   Telephone number: [phone]  Type of interpretation: Face-to-face, spoken        Iris Looney CMA  10/8/2018  4:13 PM  Signed  Injectable influenza vaccine documentation    1. Has the patient received the information for the influenza vaccine? YES    2. Does the patient have a severe allergy to eggs (Patients with a severe egg allergy should be assessed by a medical provider, RN, or clinical pharmacist. If they receive the influenza vaccine, please have them observed for 15 minutes.)? No    3. Has the patient had an allergic reaction to previous influenza vaccines? No    4. Has the patient had any severe allergic reactions to past influenza vaccines ? No       5. Does patient have a history of Guillain-Greenwood syndrome? No      Based on responses above, I administered the influenza vaccine.  Iris Looney CMA      SUBJECTIVE  Po Pree is a 48 year old female with past medical history significant for    Patient Active Problem List   Diagnosis     Self-limiting autoimmune thyroiditis with transient hyperthyroidism and/or hypothyroidism     Health Care Home     Lump In / On the skin     Vitamin D deficiency     Screening for depression     CTS (carpal tunnel syndrome)     Other chronic nonalcoholic liver disease     Others present at the visit:   Kenny Rooney    Presents for   Chief Complaint   Patient presents with     Follow Up For     Pt is here to follow up on body pain.     Medication Reconciliation     Complete.      Flu Shot     Pt would like her flu shot today.      Patient is here today for routine  follow-up.  She endorses overall feeling well.  Has medications at home that she takes on an as-needed basis if she develops pain abdominal comfort or gas and bloating.  Is comfortable taking these appropriately.  Would like a refill on these medications, in particular the simethicone, as this is quite helpful for her with bloating.  She notices symptoms most days of the week and takes on average 1 pill of simethicone per day.  Finds it helpful.  Has been eating good fiber and lots of fruits and vegetables in her diet.  She continues to exercise regularly as it helps her to feel better overall.    She does get some occasional numbness and tingling on her right side, which is chronic for her.  Takes naproxen when this happens and this takes care of the pain.  Just got a refill on that medication.    We discussed that she is getting older and some new preventative testing is recommended.  She would like a flu shot today.  Is okay with doing a mammogram.  She will schedule a follow-u visit to complete her Pap smear.  We discussed colonoscopy versus FIT testing and she would like to proceed with the fit testing.    OBJECTIVE:  Vitals: /77 (BP Location: Left arm, Patient Position: Sitting, Cuff Size: Adult Regular)  Pulse 88  Temp 98.2  F (36.8  C) (Oral)  Resp 16  Wt 156 lb 9.6 oz (71 kg)  LMP 01/18/2015 (Approximate)  SpO2 98%  BMI 27.09 kg/m2  BMI= Body mass index is 27.09 kg/(m^2).  Vitals:  Vitals are reviewed and are within the normal range  Gen:  Alert, pleasant, no acute distress  Cardiac:  Regular rate and rhythm, no murmurs, rubs or gallops  Respiratory:  Lungs clear to auscultation bilaterally  Abdomen:  Soft, non-tender, non-distended, bowel sounds positive  Extremities:  Warm, well-perfused, pulses 2+/4, no lower extremity edema      ASSESSMENT AND PLAN:      Po was seen today for follow up for, medication reconciliation and flu shot.  I refilled her simethicone, ranitidine, and naproxen.   Additionally we discussed her recommended health maintenance, and will schedule mammogram, send her home with a fit test, and she will schedule follow-up appointment for her Pap.  Flu shot was also given today.    Diagnoses and all orders for this visit:    Preventative health care  -     MA SCREENING DIGITAL BILAT; Future  -     Fecal Occult Blood - FIT, iFOB ([facility]); Future    Carpal tunnel syndrome of right wrist  -     naproxen (NAPROSYN) 375 MG tablet; Take 1 tablet (375 mg) by mouth 2 times daily (with meals) Take together with ranitidine    Gastroesophageal reflux disease, esophagitis presence not specified  -     ranitidine (ZANTAC) 150 MG tablet; Take 2 tablets (300 mg) by mouth At Bedtime Take 2 tablets daily with naproxen    Flatulence, eructation and gas pain  -     Simethicone 125 MG CAPS; Take 1 tablet by mouth 3 times daily as needed (gas or stomach pain) For gas    Need for immunization against influenza  -     FLU VAC QUADRIVLENT SPLIT VIRUS IM 0.5ml dosage  -     ADMIN VACCINE, INITIAL        Patient Instructions   Refill stomach medications.      Schedule mammogram.      Schedule follow up for pap smear in next few weeks.      Go down to lab to do fit testing.      MA SCREENING DIGITAL BILAT  [facility]  Phone: [phone]  Fax: [phone]    [place] [address]    Appointment:  Tuesday October 17, 2018  Arrival Time: 10:00 am    Please do not wear any deodorant, powder, lotion or scented perfume the day of your appointment    Please bring a copy of your insurance card and photo ID    If you cannot make this appointment please call [phone] to reschedule    October 8, 2018 at 4:37 pm printed appointment information and [facility]. Order faxed to [phone]. twan Harman

## 2018-10-12 DIAGNOSIS — Z00.00 PREVENTATIVE HEALTH CARE: ICD-10-CM

## 2018-10-12 LAB — HEMOCCULT STL QL IA: NEGATIVE

## 2018-10-12 NOTE — PROGRESS NOTES
Po Pree-    Your FIT testing is normal.  This is good news!  We should repeat the test in 1 year.  Please call the clinic at [phone] if you have any questions.      Nellie Harman    Please send results to patient.

## 2018-10-12 NOTE — LETTER
October 12, 2018      Saman Enrique  [address] [address]        Dear Po,  Your FIT testing is normal.  This is good news!  We should repeat the test in 1 year.  Please call the clinic at [phone] if you have any questions.     Please see below for your test results.    Resulted Orders   Fecal Occult Blood - FIT, iFOB ([facility])   Result Value Ref Range    Occult Blood Scn FIT NEGATIVE Negative       If you have any questions, please call the clinic to make an appointment.    Sincerely,    [facility]

## 2018-10-17 ENCOUNTER — HOSPITAL ENCOUNTER (OUTPATIENT)
Dept: MAMMOGRAPHY | Facility: CLINIC | Age: 48
Discharge: HOME | End: 2018-10-17
Attending: STUDENT IN AN ORGANIZED HEALTH CARE EDUCATION/TRAINING PROGRAM

## 2018-10-17 DIAGNOSIS — Z00.00 PREVENTATIVE HEALTH CARE: ICD-10-CM

## 2018-10-17 DIAGNOSIS — Z12.31 VISIT FOR SCREENING MAMMOGRAM: ICD-10-CM

## 2018-10-17 LAB — MAMMOGRAM: NORMAL

## 2018-10-18 NOTE — PROGRESS NOTES
Po Pree-    Here is a copy of your mammogram.  Everything looks good!  This is good news.  Your next mammogram is due in 1-2 years.  Please call the clinic at [phone] if you have any questions.      Nellie Harman    Please send results to patient.

## 2019-10-22 ENCOUNTER — ALLIED HEALTH/NURSE VISIT (OUTPATIENT)
Dept: FAMILY MEDICINE | Facility: CLINIC | Age: 49
End: 2019-10-22
Payer: COMMERCIAL

## 2019-10-22 VITALS
HEART RATE: 69 BPM | TEMPERATURE: 98 F | DIASTOLIC BLOOD PRESSURE: 81 MMHG | OXYGEN SATURATION: 98 % | SYSTOLIC BLOOD PRESSURE: 133 MMHG

## 2019-10-22 DIAGNOSIS — Z23 NEED FOR PROPHYLACTIC VACCINATION AND INOCULATION AGAINST INFLUENZA: Primary | ICD-10-CM

## 2019-10-22 NOTE — NURSING NOTE
Due to patient being non-English speaking/uses sign language, an  was used for this visit. Only for face-to-face interpretation by an external agency, date and length of interpretation can be found on the scanned worksheet.     name: Colton Boyd  Agency: [employer]  Language: Danna   Telephone number: [phone]  Type of interpretation: Face-to-face, spoken

## 2019-11-19 ENCOUNTER — OFFICE VISIT (OUTPATIENT)
Dept: FAMILY MEDICINE | Facility: CLINIC | Age: 49
End: 2019-11-19
Payer: COMMERCIAL

## 2019-11-19 VITALS
WEIGHT: 149.8 LBS | BODY MASS INDEX: 26.54 KG/M2 | SYSTOLIC BLOOD PRESSURE: 116 MMHG | TEMPERATURE: 98.2 F | DIASTOLIC BLOOD PRESSURE: 57 MMHG | HEIGHT: 63 IN | RESPIRATION RATE: 18 BRPM | HEART RATE: 89 BPM | OXYGEN SATURATION: 97 %

## 2019-11-19 DIAGNOSIS — K76.0 FATTY LIVER: ICD-10-CM

## 2019-11-19 DIAGNOSIS — G62.9 NEUROPATHY: ICD-10-CM

## 2019-11-19 DIAGNOSIS — Z23 NEED FOR VACCINATION: Primary | ICD-10-CM

## 2019-11-19 DIAGNOSIS — Z00.00 HEALTHCARE MAINTENANCE: ICD-10-CM

## 2019-11-19 LAB
CHOLEST SERPL-MCNC: 188.2 MG/DL (ref 0–200)
CHOLEST/HDLC SERPL: 3.9 {RATIO} (ref 0–5)
GLUCOSE CASUAL: 93 MG/DL (ref 51–200)
HDLC SERPL-MCNC: 48.4 MG/DL
LDLC SERPL CALC-MCNC: 101 MG/DL (ref 0–129)
TRIGL SERPL-MCNC: 193.5 MG/DL (ref 0–150)
VLDL CHOLESTEROL: 38.7 MG/DL (ref 7–32)

## 2019-11-19 RX ORDER — MULTIVITAMIN
1 TABLET ORAL DAILY
Qty: 100 TABLET | Refills: 3 | Status: SHIPPED | OUTPATIENT
Start: 2019-11-19 | End: 2020-04-27

## 2019-11-19 RX ORDER — GABAPENTIN 100 MG/1
100 CAPSULE ORAL 3 TIMES DAILY
Qty: 90 CAPSULE | Refills: 1 | Status: SHIPPED | OUTPATIENT
Start: 2019-11-19 | End: 2020-04-27

## 2019-11-19 ASSESSMENT — MIFFLIN-ST. JEOR: SCORE: 1267.58

## 2019-11-19 NOTE — LETTER
November 27, 2019      Saman Enrique  [address] [address]        Dear Saman Enrique,     I am writing you this letter regarding your results because we were unable to reach you by phone. Your blood tests were good.     Please call the clinic to update your phone number and other demographics. Thanks.    Please see below for your test results.    Resulted Orders   Lipid Panel ([facility])   Result Value Ref Range    Cholesterol 188.2 0.0 - 200.0 mg/dL    Cholesterol/HDL Ratio 3.9 0.0 - 5.0    HDL Cholesterol 48.4 >40.0 mg/dL    LDL Cholesterol Calculated 101 0 - 129 mg/dL    Triglycerides 193.5 (H) 0.0 - 150.0 mg/dL    VLDL Cholesterol 38.7 (H) 7.0 - 32.0 mg/dL   Glucose Casual ([facility])   Result Value Ref Range    Glucose Casual 93.0 51.0 - 200.0 mg/dL       If you have any questions, please call the clinic to make an appointment.    Sincerely,    Ivett Weems MD

## 2019-11-19 NOTE — NURSING NOTE
Due to patient being non-English speaking/uses sign language, an  was used for this visit. Only for face-to-face interpretation by an external agency, date and length of interpretation can be found on the scanned worksheet.     name: Bridger Rooney  Language: Danna  Agency: [employer]  Phone number: [phone]  Type of interpretation: Face-to-face, spoken

## 2019-11-19 NOTE — PROGRESS NOTES
Assessment & Plan   Complaints of burning, itching pain across right upper back.  At first this sounded to perhaps be Shingles, but there are no associated skin findings, and though it is isolated to the right side it does not exactly follow a dermatome.  No associated motor or sensory changes.  Overall, this is some sort of non-specific neuropathy that has responded well to gabapentin the past.  -- Gabapentin 100 mg TID.    Follow-up fatty liver disease.  -- Control of metabolic factors: Check lipids.  Not currently on statin.  Will also check random glucose today, and if elevated, add on an A1c.  -- She has received 2 doses of hep A, but only 1 dose of hep B vaccination.  No immunity titers done before.  Will give second hep B vaccination today.    Healthcare maintenance: TDaP given today.    Return to clinic as needed.    Subjective   Saman Enrique is a 49 year old female with a history including fatty liver disease who presents with complaints of side pain.    She says that for the past month, she has experienced a burning and itchy pain over her right upper back.  It is only on her right side; no midline pain.  She is unable to see back there, so she does not know if there is any associated rash.  No injury or trauma.  She says that she used to have a medication that was helpful for this, but her pharmacy ([facility]) closed 1 year ago, and she has not picked up any medications whatsoever since then.  No arm symptoms, including numbness or weakness.  Upon review of her records, when she  complained of this in the past, her PCP documented that gabapentin was helpful, though it caused some sleepiness and was ultimately discontinued.  She would also like a multivitamin today.    It has been over the years since she has been into the clinic.      Other: Follow-up of fatty liver disease.  Past hepatic work-up was in June 2018, showing a normal hepatic panel (AST 25, ALT < 15) and ultrasound showed increased hepatic  "echotexture likely due to steatosis, compared to December 2014.    Social: She reports that she has never smoked. She has never used smokeless tobacco.     name: Bridger Rooney  Language: Danna  Agency: [employer]  Phone number: [phone]  Type of interpretation: Face-to-face, spoken    Objective   Vitals: /57   Pulse 89   Temp 98.2  F (36.8  C) (Oral)   Resp 18   Ht 1.607 m (5' 3.25\")   Wt 67.9 kg (149 lb 12.8 oz)   LMP 01/18/2015 (Approximate)   SpO2 97%   BMI 26.33 kg/m    General: Pleasant. Middle-aged woman. No distress.  Heart: Regular rate and rhythm. No murmurs, rubs, or gallops.  Lungs: Clear to auscultation bilaterally. No wheezes or crackles. Good air movement.  Musculoskeletal: Full ROM in shoulder, elbow, wrist, and hands bilaterally.  5/5 strength on shoulder abduction, shoulder shrug, biceps,  strength, finger opponens, and finger spread.  Sensation intact to monofilament testing in all major dermatomes of upper extremities bilaterally.  Skin: No suspicious lesions or rashes.    Labs reviewed:  Component      Latest Ref Rng & Units 3/8/2013   Cholesterol      <200.0 mg/dL 180.0   Triglycerides      <150.0 mg/dL 124.0   HDL Cholesterol      >50.0 mg/dL 56.0   VLDL-Cholesterol      7.0 - 32.0 mg/dL 25.0   LDL Cholesterol Direct      0.0 - 129.0 mg/dL 98.0   Cholesterol/HDL Ratio      <5.0 RATIO 3.2     "

## 2019-11-20 NOTE — RESULT ENCOUNTER NOTE
Results for orders placed or performed in visit on 11/19/19  -Lipid Panel ([facility])     Status: Abnormal       Result                                            Value                         Ref Range                       Cholesterol                                       188.2                         0.0 - 200.0 mg/dL               Cholesterol/HDL Ratio                             3.9                           0.0 - 5.0                       HDL Cholesterol                                   48.4                          >40.0 mg/dL                     LDL Cholesterol Calculated                        101                           0 - 129 mg/dL                   Triglycerides                                     193.5 (H)                     0.0 - 150.0 mg/dL               VLDL Cholesterol                                  38.7 (H)                      7.0 - 32.0 mg/dL           -Glucose Casual ([facility])     Status: None       Result                                            Value                         Ref Range                       Glucose Casual                                    93.0                          51.0 - 200.0 mg/dL           The 10-year ASCVD risk score (Manjit MARTINEZ Jr., et al., 2013) is: 1.2%    Values used to calculate the score:      Age: 49 years      Sex: Female      Is Non- : No      Diabetic: No      Tobacco smoker: No      Systolic Blood Pressure: 116 mmHg      Is BP treated: No      HDL Cholesterol: 48.4 mg/dL      Total Cholesterol: 188.2 mg/dL

## 2020-04-27 ENCOUNTER — VIRTUAL VISIT (OUTPATIENT)
Dept: FAMILY MEDICINE | Facility: CLINIC | Age: 50
End: 2020-04-27
Payer: COMMERCIAL

## 2020-04-27 VITALS — BODY MASS INDEX: 24.32 KG/M2 | HEIGHT: 65 IN | WEIGHT: 146 LBS

## 2020-04-27 DIAGNOSIS — E55.9 VITAMIN D DEFICIENCY: ICD-10-CM

## 2020-04-27 DIAGNOSIS — J06.9 VIRAL UPPER RESPIRATORY TRACT INFECTION: Primary | ICD-10-CM

## 2020-04-27 RX ORDER — ACETAMINOPHEN 500 MG
500-1000 TABLET ORAL EVERY 6 HOURS PRN
Qty: 60 TABLET | Refills: 1 | Status: SHIPPED | OUTPATIENT
Start: 2020-04-27 | End: 2020-11-10

## 2020-04-27 RX ORDER — DEXTROMETHORPHAN POLISTIREX 30 MG/5ML
60 SUSPENSION ORAL 2 TIMES DAILY
Qty: 148 ML | Refills: 0 | Status: SHIPPED | OUTPATIENT
Start: 2020-04-27 | End: 2020-11-12

## 2020-04-27 RX ORDER — IBUPROFEN 600 MG/1
600 TABLET, FILM COATED ORAL EVERY 6 HOURS PRN
Qty: 60 TABLET | Refills: 3 | Status: SHIPPED | OUTPATIENT
Start: 2020-04-27 | End: 2020-11-12

## 2020-04-27 RX ORDER — VITAMIN B COMPLEX
50 TABLET ORAL DAILY
Qty: 30 TABLET | Refills: 11 | Status: SHIPPED | OUTPATIENT
Start: 2020-04-27 | End: 2022-02-23

## 2020-04-27 ASSESSMENT — MIFFLIN-ST. JEOR: SCORE: 1273.13

## 2020-04-27 NOTE — NURSING NOTE
Due to patient being non-English speaking/uses sign language, an  was used for this visit. Only for face-to-face interpretation by an external agency, date and length of interpretation can be found on the scanned worksheet.     name: Bridger Rooney  Language: Danna  Agency: [employer]  Phone number: [phone]  Type of interpretation: Group, spoken; number of participants: 2

## 2020-04-27 NOTE — PROGRESS NOTES
"Family Medicine Telephone Visit Note         Telephone Visit Consent   Patient was verbally read the following and verbal consent was obtained.    \"This telephone visit will be conducted via a call between you and your physician/provider. We have found that certain health care needs can be provided without the need for a physical exam.  This service lets us provide the care you need with a short phone conversation.  If a prescription is necessary we can send it directly to your pharmacy.  If lab work is needed we can place an order for that and you can then stop by our lab to have the test done at a later time.    Telephone visits are billed at different rates depending on your insurance coverage. During this emergency period, for some insurers they may be billed the same as an in-person visit.  Please reach out to your insurance provider with any questions.    If during the course of the call the physician/provider feels a telephone visit is not appropriate, you will not be charged for this service.\"    Name person giving consent:  Patient   Date verbal consent given:  4/27/2020  Time verbal consent given:  3:03 PM    Chief Complaint   Patient presents with     Cough     x1 week     Fatigue     Medication Request     Cough and tired medication         Due to patient being non-English speaking/uses sign language, an  was used for this visit. Only for face-to-face interpretation by an external agency, date and length of interpretation can be found on the scanned worksheet.     name: Bridger Rooney  Agency: [employer]  Language: Danna   Telephone number: [phone]  Type of interpretation: Group, spoken; number of participants: 2         HPI   Patients name: Po  Appointment start time:  3:10 PM        Current Outpatient Medications   Medication Sig Dispense Refill     acetaminophen (TYLENOL) 500 MG tablet Take 1-2 tablets (500-1,000 mg) by mouth every 6 hours as needed for mild pain 60 tablet 1     " "dextromethorphan (DELSYM) 30 MG/5ML liquid Take 10 mLs (60 mg) by mouth 2 times daily 148 mL 0     ibuprofen (ADVIL/MOTRIN) 600 MG tablet Take 1 tablet (600 mg) by mouth every 6 hours as needed for moderate pain 60 tablet 3     Vitamin D3 (CHOLECALCIFEROL) 25 mcg (1000 units) tablet Take 2 tablets (50 mcg) by mouth daily 30 tablet 11     gabapentin (NEURONTIN) 100 MG capsule Take 1 capsule (100 mg) by mouth 3 times daily (Patient not taking: Reported on 4/27/2020) 90 capsule 1     multivitamin (ONE-DAILY) tablet Take 1 tablet by mouth daily (Patient not taking: Reported on 4/27/2020) 100 tablet 3     Allergies   Allergen Reactions     Nkda [No Known Drug Allergies]      She has a cough. When she coughs, she coughs a lot, which causes her chest to hurt. This pain makes her feel tired as well. This has been going on for about 1 week already. She has had fevers or chills, but when she takes acetaminophen it goes away. She doesn't have any fever any more. She said that her kid is sick. She just feels tired. She denies nausea, vomiting, diarrhea. She said that she has an itchy throat that makes her cough. She does want to try cough medications.         Review of Systems:     ROS COMP: See HPI         Physical Exam:     Ht 1.651 m (5' 5\")   Wt 66.2 kg (146 lb)   LMP 01/18/2015 (Approximate)   BMI 24.30 kg/m    Estimated body mass index is 24.3 kg/m  as calculated from the following:    Height as of this encounter: 1.651 m (5' 5\").    Weight as of this encounter: 66.2 kg (146 lb).    Exam:  Constitutional: healthy, alert and no distress  Psychiatric: mentation appears normal and affect normal/bright     Results from the last 24 hoursNo results found for this or any previous visit (from the past 24 hour(s)).        Assessment and Plan   Po was seen today for cough, fatigue and medication request.    Diagnoses and all orders for this visit:    Viral upper respiratory tract infection: discussed that she can do symptomatic " treatment. Discussed that with the current COVID outbreak she may have COVID and she should stay home for at least a week or for three days after her last symptoms, whichever is longer. Discussed that she can take ibuprofen, acetaminophen, or dextromethorphan as needed for her symptoms. She should also try and stay away from other people as much as possible.    Refilled medications that would be required in the next 3 months.     After Visit Information:  Patient declined AVS     No follow-ups on file.    Appointment end time: 3:17 PM  This is a telephone visit that took 7 minutes.      Clinician location:  [facility]    La Jiménez MD  I precepted today with Dr. Weems.

## 2020-04-27 NOTE — PROGRESS NOTES
Preceptor attestation:    I discussed the patient with the resident, and I spoke to the patient by telephone. I have verified the content of the note, which accurately reflects my assessment of the patient and the plan of care.    Supervising physician: Ivett Weems MD  [facility]

## 2020-11-10 ENCOUNTER — OFFICE VISIT (OUTPATIENT)
Dept: FAMILY MEDICINE | Facility: CLINIC | Age: 50
End: 2020-11-10
Payer: COMMERCIAL

## 2020-11-10 VITALS
SYSTOLIC BLOOD PRESSURE: 135 MMHG | TEMPERATURE: 98.6 F | BODY MASS INDEX: 25.46 KG/M2 | DIASTOLIC BLOOD PRESSURE: 70 MMHG | OXYGEN SATURATION: 98 % | HEART RATE: 94 BPM | RESPIRATION RATE: 16 BRPM | WEIGHT: 153 LBS

## 2020-11-10 DIAGNOSIS — G62.9 NEUROPATHY: ICD-10-CM

## 2020-11-10 DIAGNOSIS — Z23 NEED FOR PROPHYLACTIC VACCINATION AND INOCULATION AGAINST INFLUENZA: Primary | ICD-10-CM

## 2020-11-10 DIAGNOSIS — J06.9 VIRAL UPPER RESPIRATORY TRACT INFECTION: ICD-10-CM

## 2020-11-10 DIAGNOSIS — K59.00 CONSTIPATION, UNSPECIFIED CONSTIPATION TYPE: ICD-10-CM

## 2020-11-10 DIAGNOSIS — R14.0 BLOATING: ICD-10-CM

## 2020-11-10 PROCEDURE — 99214 OFFICE O/P EST MOD 30 MIN: CPT | Mod: 25 | Performed by: STUDENT IN AN ORGANIZED HEALTH CARE EDUCATION/TRAINING PROGRAM

## 2020-11-10 PROCEDURE — 90686 IIV4 VACC NO PRSV 0.5 ML IM: CPT | Performed by: STUDENT IN AN ORGANIZED HEALTH CARE EDUCATION/TRAINING PROGRAM

## 2020-11-10 PROCEDURE — 90471 IMMUNIZATION ADMIN: CPT | Performed by: STUDENT IN AN ORGANIZED HEALTH CARE EDUCATION/TRAINING PROGRAM

## 2020-11-10 RX ORDER — SIMETHICONE 125 MG
1 CAPSULE ORAL 3 TIMES DAILY PRN
Qty: 30 CAPSULE | Refills: 1 | Status: SHIPPED | OUTPATIENT
Start: 2020-11-10 | End: 2021-01-26

## 2020-11-10 RX ORDER — GABAPENTIN 100 MG/1
100 CAPSULE ORAL 3 TIMES DAILY
Qty: 90 CAPSULE | Refills: 1 | Status: SHIPPED | OUTPATIENT
Start: 2020-11-10 | End: 2021-01-06

## 2020-11-10 RX ORDER — SENNOSIDES A AND B 8.6 MG/1
1 TABLET, FILM COATED ORAL 2 TIMES DAILY PRN
Qty: 60 TABLET | Refills: 3 | Status: SHIPPED | OUTPATIENT
Start: 2020-11-10 | End: 2021-01-26

## 2020-11-10 RX ORDER — ACETAMINOPHEN 500 MG
500-1000 TABLET ORAL EVERY 6 HOURS PRN
Qty: 60 TABLET | Refills: 1 | Status: SHIPPED | OUTPATIENT
Start: 2020-11-10 | End: 2021-01-26

## 2020-11-10 NOTE — PROGRESS NOTES
.    Nursing Notes:   Iris Looney, [facility]  11/10/2020  2:06 PM  Signed  Due to patient being non-English speaking/uses sign language, an  was used for this visit. Only for face-to-face interpretation by an external agency, date and length of interpretation can be found on the scanned worksheet.     name: Josh Mendoza (Htoo)  Language: Danna  Agency:  [employer]  Phone number: [phone]  Type of interpretation:  Face-to-face, spoken      SUBJECTIVE  Po Klaus is a 50 year old female with past medical history significant for    Patient Active Problem List   Diagnosis     Self-limiting autoimmune thyroiditis with transient hyperthyroidism and/or hypothyroidism     Health Care Home     Lump In / On the skin     Vitamin D deficiency     Screening for depression     CTS (carpal tunnel syndrome)     Other chronic nonalcoholic liver disease     Others present at the visit:   Josh Mendoza    Presents for   Chief Complaint   Patient presents with     Back Pain     Pt is here to follow up on back pain.     Medication Reconciliation     Complete.      Imm/Inj     Flu Shot     Patient presents today for follow-up on right upper quadrant and flank pain.  This is been a longstanding problem.  Pain is located in the lower ribs on the right side.  Gets numbers and treat mood good neuropathic type symptoms as well as generalized bloating and discomfort in the area.  Has had ultrasounds and lab work in the past.  Most recent work-up in 2018 showed normal LFTs, normal lipase, normal CBC with no elevated white count.  Ultrasound shows fatty liver disease.  She reports some recent worsening in bloating symptoms, constipation, and pain on the right side and flank.  Comes and goes.  Typically fairly short-lived and resolves on its own, but she did get benefit from a number of medications that we tried in the past.  She would like to restart some of these medications today.  Was also given Tylenol at her  last visit and this is been mildly helpful as well.  She would like a refill.    She reports mood is good.  Kids are doing well.  She is sleeping fine.  Eating and drinking is going well.  No change in appetite.  No acid reflux, no chest pain, no shortness of breath, no headache.  She does get occasional dizziness.  Has been trying to eat healthy and to get some exercise even with COVID-19.  Family is staying safe.    OBJECTIVE:  Vitals: /70 (BP Location: Left arm, Patient Position: Sitting, Cuff Size: Adult Regular)   Pulse 94   Temp 98.6  F (37  C) (Oral)   Resp 16   Wt 69.4 kg (153 lb)   LMP 01/18/2015 (Approximate)   SpO2 98%   BMI 25.46 kg/m    BMI= Body mass index is 25.46 kg/m .  Vitals:  Vitals are reviewed and are within the normal range  Gen:  Alert, pleasant, no acute distress  Cardiac:  Regular rate and rhythm, no murmurs, rubs or gallops  Respiratory:  Lungs clear to auscultation bilaterally  Abdomen:  Soft, non-tender, non-distended, bowel sounds positive, no palpable hepatosplenomegaly, no CVA tenderness.  Some pain over what I believed to be a lipoma in the right lateral rib.  Mobile and soft.  Extremities:  Warm, well-perfused, pulses 2+/4, no lower extremity edema    PHQ 7/13/2016 8/17/2016 10/8/2018   PHQ-9 Total Score 9 7 1   Q9: Thoughts of better off dead/self-harm past 2 weeks Several days Not at all Not at all       ASSESSMENT AND PLAN:      Po was seen today for back pain, medication reconciliation and imm/inj.    Diagnoses and all orders for this visit:    Need for prophylactic vaccination and inoculation against influenza.  Flu shot done today.  Patient is due for Pap and preventative visit, so we will schedule this for December.  -     Cancel: INFLUENZA QUAD, RECOMBINANT, P-FREE (RIV4) (FLUBLOCK) [71778]  -     INFLUENZA VACCINE IM > 6 MONTHS VALENT IIV4 [12688]    Right upper quadrant pain.  Bloating. Neuropathy.  Has had improvement in bloating and gas symptoms with  simethicone.  Will refill this.  With shooting pain, the gabapentin has been helpful.  She just takes this as needed.  We will utilize the 3 medicines as appropriate.  -     Simethicone 125 MG CAPS; Take 1 dose. by mouth 3 times daily as needed (gas pain)  -     acetaminophen (TYLENOL) 500 MG tablet; Take 1-2 tablets (500-1,000 mg) by mouth every 6 hours as needed for mild pain  -     gabapentin (NEURONTIN) 100 MG capsule; Take 1 capsule (100 mg) by mouth 3 times daily    Constipation, unspecified constipation type.  Is also having constipation.  Discussed diet and she will work on increasing her fiber, and we will refill the 7 that has been helpful in the past.  -     senna (SENOKOT) 8.6 MG tablet; Take 1 tablet by mouth 2 times daily as needed for constipation        Patient Instructions   4 new medications at the pharmacy to .  Bring them with for your next visit  Schedule follow up appointment for physical and Pap.     Simethicone:  Pill for gas  Senna:  Pill for constipation  Gabapentin:  Nerve Pain medication.   Tylenol:  As needed for headache.        Follow up in 1 month for Completed Physical.      Nellie Harman MD

## 2020-11-10 NOTE — NURSING NOTE
Due to patient being non-English speaking/uses sign language, an  was used for this visit. Only for face-to-face interpretation by an external agency, date and length of interpretation can be found on the scanned worksheet.     name: Josh Mendoza (Htoo)  Language: Danna  Agency:  [employer]  Phone number: [phone]  Type of interpretation:  Face-to-face, spoken

## 2020-11-10 NOTE — PATIENT INSTRUCTIONS
4 new medications at the pharmacy to .  Bring them with for your next visit  Schedule follow up appointment for physical and Pap.     Simethicone:  Pill for gas  Senna:  Pill for constipation  Gabapentin:  Nerve Pain medication.   Tylenol:  As needed for headache.

## 2021-01-26 ENCOUNTER — OFFICE VISIT (OUTPATIENT)
Dept: FAMILY MEDICINE | Facility: CLINIC | Age: 51
End: 2021-01-26
Payer: COMMERCIAL

## 2021-01-26 VITALS
SYSTOLIC BLOOD PRESSURE: 123 MMHG | DIASTOLIC BLOOD PRESSURE: 75 MMHG | OXYGEN SATURATION: 98 % | WEIGHT: 150.8 LBS | TEMPERATURE: 98.4 F | RESPIRATION RATE: 16 BRPM | HEART RATE: 86 BPM | BODY MASS INDEX: 25.09 KG/M2

## 2021-01-26 DIAGNOSIS — R21 RASH: ICD-10-CM

## 2021-01-26 DIAGNOSIS — Z00.00 ROUTINE GENERAL MEDICAL EXAMINATION AT A HEALTH CARE FACILITY: ICD-10-CM

## 2021-01-26 DIAGNOSIS — R14.0 BLOATING: ICD-10-CM

## 2021-01-26 DIAGNOSIS — Z01.818 PREOP GENERAL PHYSICAL EXAM: Primary | ICD-10-CM

## 2021-01-26 DIAGNOSIS — J06.9 VIRAL UPPER RESPIRATORY TRACT INFECTION: ICD-10-CM

## 2021-01-26 DIAGNOSIS — K59.00 CONSTIPATION, UNSPECIFIED CONSTIPATION TYPE: ICD-10-CM

## 2021-01-26 PROCEDURE — 90746 HEPB VACCINE 3 DOSE ADULT IM: CPT | Performed by: STUDENT IN AN ORGANIZED HEALTH CARE EDUCATION/TRAINING PROGRAM

## 2021-01-26 PROCEDURE — 90471 IMMUNIZATION ADMIN: CPT | Performed by: STUDENT IN AN ORGANIZED HEALTH CARE EDUCATION/TRAINING PROGRAM

## 2021-01-26 PROCEDURE — 99396 PREV VISIT EST AGE 40-64: CPT | Mod: 25 | Performed by: STUDENT IN AN ORGANIZED HEALTH CARE EDUCATION/TRAINING PROGRAM

## 2021-01-26 RX ORDER — SENNOSIDES A AND B 8.6 MG/1
1 TABLET, FILM COATED ORAL 2 TIMES DAILY PRN
Qty: 60 TABLET | Refills: 3 | Status: SHIPPED | OUTPATIENT
Start: 2021-01-26 | End: 2023-02-03

## 2021-01-26 RX ORDER — CLOTRIMAZOLE 1 %
CREAM (GRAM) TOPICAL 2 TIMES DAILY
Qty: 15 G | Refills: 0 | Status: SHIPPED | OUTPATIENT
Start: 2021-01-26 | End: 2022-04-26

## 2021-01-26 RX ORDER — ACETAMINOPHEN 500 MG
500-1000 TABLET ORAL EVERY 6 HOURS PRN
Qty: 60 TABLET | Refills: 1 | Status: SHIPPED | OUTPATIENT
Start: 2021-01-26 | End: 2021-04-05

## 2021-01-26 RX ORDER — SIMETHICONE 125 MG
1 CAPSULE ORAL 3 TIMES DAILY PRN
Qty: 30 CAPSULE | Refills: 1 | Status: SHIPPED | OUTPATIENT
Start: 2021-01-26 | End: 2022-02-23

## 2021-01-26 NOTE — PROGRESS NOTES
Female Physical Note    Concerns today:     Some trouble with new neck pain, last 3-4 days, on the right side.  Sore to touch, not sharp pain and no numbness tingling or burning.    She also continues to have pain in her right upper back and right sided rib cage.  This has been longstanding and comes and goes.  She has episodes about 2-3 times per month.  Can be very uncomfortable, but tend to go away on their own.  She associates this with abdominal gassiness and bloating.  Does get some numbness and tingling in her hands as well.  Takes gabapentin when this happens it seems to help.  Overall this is been stable and was not further addressed evaluated today.    A Zervant  was used for  this visit.     ROS:  CONSTITUTIONAL: no fatigue, no unexpected change in weight  SKIN: Rash present behind left ear.  There was some itchy.  EYES: Does get some blurry vision.  She is due to see the eye doctor.  ENT: no ear problems, no mouth problems, no throat problems  RESP: no significant cough, no shortness of breath  CV: no chest pain, no palpitations, no new or worsening peripheral edema.  Does get some right-sided rib pain.  GI: no nausea, no vomiting, does have some difficulty with constipation, and takes stool softeners to help with this.  : no frequency, no dysuria, no hematuria  MS: no claudication, no myalgias, no joint aches  NEURO: Gets numbness and tingling occasionally in the right hand, but no weakness, no dizziness, no headaches.  ENDOCRINE: no temperature intolerance, no skin/hair changes  HEME: no easy bruising, no bleeding problems  PSYCHIATRIC: NEGATIVE for changes in mood or affect    Sexually Active: Yes  Sexual concerns: No   Contraception:not needed   P: 4  Menarche:  Patient's last menstrual period was 2015 (approximate). Menopausal since:   STD History: Neg  Last Pap Smear Date:  normal  Abnormal Pap History: None    Patient Active Problem List   Diagnosis     Self-limiting  autoimmune thyroiditis with transient hyperthyroidism and/or hypothyroidism     Health Care Home     Lump In / On the skin     Vitamin D deficiency     Screening for depression     CTS (carpal tunnel syndrome)     Other chronic nonalcoholic liver disease       Current Outpatient Medications   Medication Sig Dispense Refill     acetaminophen (TYLENOL) 500 MG tablet Take 1-2 tablets (500-1,000 mg) by mouth every 6 hours as needed for mild pain 60 tablet 1     gabapentin (NEURONTIN) 100 MG capsule TAKE 1 CAPSULE(100 MG) BY MOUTH THREE TIMES DAILY 90 capsule 1     senna (SENOKOT) 8.6 MG tablet Take 1 tablet by mouth 2 times daily as needed for constipation 60 tablet 3     Simethicone 125 MG CAPS Take 1 dose. by mouth 3 times daily as needed (gas pain) 30 capsule 1     Vitamin D3 (CHOLECALCIFEROL) 25 mcg (1000 units) tablet Take 2 tablets (50 mcg) by mouth daily 30 tablet 11       Past Medical History:   Diagnosis Date     CVA (cerebral infarction)      Hepatitis B immune      History of blood transfusion      History of chicken pox         Family History     Problem (# of Occurrences) Relation (Name,Age of Onset)    Diabetes (1) Other: mother in law       Negative family history of: Coronary Artery Disease, Hypertension, Hyperlipidemia, Cerebrovascular Disease, Breast Cancer, Colon Cancer, Prostate Cancer, Other Cancer, Depression, Anxiety Disorder, Mental Illness, Substance Abuse, Anesthesia Reaction, Asthma, Osteoporosis, Genetic Disorder, Thyroid Disease, Obesity, Unknown/Adopted          Problem List Medication List and Allergy List were reviewed and updated.    Patient is an established patient of this clinic..    Social History     Tobacco Use     Smoking status: Never Smoker     Smokeless tobacco: Never Used   Substance Use Topics     Alcohol use: No       Children ? yes 4 ages 8 to 19    Has anyone hurt you physically, for example by pushing, hitting, slapping or kicking you or forcing you to have sex?  Denies  Do you feel threatened or controlled by a partner, ex-partner or anyone in your life? Denies    RISK BEHAVIORS AND HEALTHY HABITS:  Tobacco Use/Smoking: None  Illicit Drug Use: None  Do you use alcohol? No  Diet (5-7 servings of fruits/veg daily): Yes   Exercise (30 min accumulated most days):Yes  Dental Care: Yes   Calcium 1500 mg/d:  No  Seat Belt Use: Yes     Cholesterol Level (>44 yo or at risk):  Date done 2019, no statin indicated.  Not repeated today.  Pap/HPV cotest every 5 years for women 30-65   Recommended and patient accepted testing.  Colon CA Screening (>50-75 ):  Recommended and patient accepted testing.  FIT testing today  Breast CA Screening (>39 yo or 10 y before 1st degree relative diagnosis): Recommended and patient accepted testing.  CV Risk based on Pooled Cohort Risk:4.2% statin not indicated    Immunization History   Administered Date(s) Administered     HEPA 08/23/2007, 11/04/2009     HepB 04/10/2007     HepB-Adult 11/19/2019, 01/26/2021     Hib (PRP-T) 09/17/2012     Influenza (IIV3) PF 09/17/2012, 09/24/2013     Influenza Vaccine IM > 6 months Valent IIV4 11/10/2020     Influenza Vaccine, 6+MO IM (QUADRIVALENT W/PRESERVATIVES) 10/30/2014, 01/19/2016, 10/20/2016, 12/01/2017, 10/08/2018, 10/22/2019     MMR 04/10/2007, 08/17/2007     Mantoux Tuberculin Skin Test 07/26/2016     Poliovirus, inactivated (IPV) 08/23/2007, 11/04/2009     TD (ADULT, 7+) 04/10/2007, 11/04/2009     TDAP Vaccine (Boostrix) 11/19/2019     Tdap (Adacel,Boostrix) 07/16/2007    Reviewed Immunization Record Today    EXAMINATION:   /75 (BP Location: Left arm, Patient Position: Sitting, Cuff Size: Adult Regular)   Pulse 86   Temp 98.4  F (36.9  C) (Oral)   Resp 16   Wt 68.4 kg (150 lb 12.8 oz)   LMP 01/18/2015 (Approximate)   SpO2 98%   BMI 25.09 kg/m    GENERAL: healthy, alert and no distress  EYES: Eyes grossly normal to inspection, extraocular movements - intact, and PERRL  HENT: ear canals- normal;  TMs- normal; Nose- normal; Mouth- no ulcers, no lesions  NECK: no tenderness, no adenopathy, no asymmetry, no masses, no stiffness; thyroid- normal to palpation  RESP: lungs clear to auscultation - no rales, no rhonchi, no wheezes  BREAST: no masses, no tenderness, no nipple discharge, no palpable axillary masses or adenopathy.  Hyperpigmented irregular mole skin change below her right breast.  Patient reports this is longstanding and has not been changing.  CV: regular rates and rhythm, normal S1 S2, no S3 or S4 and no murmur, no click or rub -  ABDOMEN: soft, no tenderness, no  hepatosplenomegaly, no masses, normal bowel sounds  MS: extremities- no gross deformities noted, no edema  SKIN: Mildly red flaky rash present behind patient's left ear  NEURO: strength and tone- normal, sensory exam- grossly normal, mentation- intact, speech- normal, reflexes- symmetric  BACK: no CVA tenderness, no paralumbar tenderness  - female: cervix- normal, adnexae- normal; uterus- normal, no masses, no discharge, cervix is normal in appearance  RECTAL- female: Hemorrhoids present  PSYCH: Alert and oriented times 3; speech- coherent , normal rate and volume; able to articulate logical thoughts, able to abstract reason, no tangential thoughts, no hallucinations or delusions, affect- normal  LYMPHATICS: ant. cervical- normal, post. cervical- normal, axillary- normal, supraclavicular- normal, inguinal- normal    Assessment and plan:  Po was seen today for physical, imm/inj, radiology visit and colonoscopy.    Diagnoses and all orders for this visit:    Preventative health maintenance: Discussed screening recommendations.  No indication for statin.  Blood pressure is normal.  Not high risk for diabetes.  Encouraged healthy diet and increased exercise.  Patient is mildly overweight, so recommended continued exercise and 5 pound weight loss.  We will do mammogram, Pap, and fit testing.  -     MA SCREENING DIGITAL BILAT; Future  -     GYN  Cytology ([facility])  -     Fecal Occult Blood - FIT, iFOB ([facility]); Future  -     HEPATITIS B VACCINE,ADULT,IM    Constipation, unspecified constipation type.  Refilled patient's stool softeners  -     senna (SENOKOT) 8.6 MG tablet; Take 1 tablet by mouth 2 times daily as needed for constipation    Bloating.  Refilled patient's medication for gas pain.  -     Simethicone 125 MG CAPS; Take 1 dose. by mouth 3 times daily as needed (gas pain)    Right sternocleidomastoid pain:   Tylenol for neck pain.  -     acetaminophen (TYLENOL) 500 MG tablet; Take 1-2 tablets (500-1,000 mg) by mouth every 6 hours as needed for mild pain    Rash-present times year.  Appears fungal.  Will treat with clotrimazole.  -     clotrimazole (LOTRIMIN) 1 % external cream; Apply topically 2 times daily    Nellie Harman MD

## 2021-01-26 NOTE — PATIENT INSTRUCTIONS
We will call to schedule mammogram  Stop in lab for fit test  Refilled medications  Cream for rash at the pharmacy.      Preventive Health Recommendations  Female Ages 50 - 64    Yearly exam: See your health care provider every year in order to  o Review health changes.   o Discuss preventive care.    o Review your medicines if your doctor has prescribed any.      Get a Pap test every three years (unless you have an abnormal result and your provider advises testing more often).    If you get Pap tests with HPV test, you only need to test every 5 years, unless you have an abnormal result.     You do not need a Pap test if your uterus was removed (hysterectomy) and you have not had cancer.    You should be tested each year for STDs (sexually transmitted diseases) if you're at risk.     Have a mammogram every 1 to 2 years.    Have a colonoscopy at age 50, or have a yearly FIT test (stool test). These exams screen for colon cancer.      Have a cholesterol test every 5 years, or more often if advised.    Have a diabetes test (fasting glucose) every three years. If you are at risk for diabetes, you should have this test more often.     If you are at risk for osteoporosis (brittle bone disease), think about having a bone density scan (DEXA).    Shots: Get a flu shot each year. Get a tetanus shot every 10 years.    Nutrition:     Eat at least 5 servings of fruits and vegetables each day.    Eat whole-grain bread, whole-wheat pasta and brown rice instead of white grains and rice.    Get adequate Calcium and Vitamin D.     Lifestyle    Exercise at least 150 minutes a week (30 minutes a day, 5 days a week). This will help you control your weight and prevent disease.    Limit alcohol to one drink per day.    No smoking.     Wear sunscreen to prevent skin cancer.     See your dentist every six months for an exam and cleaning.    See your eye doctor every 1 to 2 years.

## 2021-01-26 NOTE — LETTER
February 3, 2021      Po Klaus  [address] [facility] [address]        Dear ,    We are writing to inform you of your test results.    Your pap smear is normal.  This is good news.  Please call the clinic at [phone] if you have any questions.    Resulted Orders   [facility] ([facility])   Result Value Ref Range    Lab AP Case Report SEE RESULTS BELOW       Comment:      Gynecologic Cytology Report                       Case: [id number]                                     Authorizing Provider:  Nellie Harman MD     Collected:             01/26/2021 1033              Ordering Location:      [facility]      Received:              01/27/2021 0556                                     [facility]                                                                                     Laboratory                                                                     First Screen:          Shantel Blackwood CT                                                                              (ASCP)                                                                         Specimen:    SUREPATH PAP, SCREENING, Endocervical/cervical                                               Lab AP Gyn Interpretation SEE RESULTS BELOW Negative for squamous intraepithelial le      Comment:      Negative for squamous intraepithelial lesion or malignancy  Electronically signed by Shantel Blackwood CT (ASCP) on 2/2/2021 at  4:32   PM      Lab AP Malignant? Normal Normal    Specimen adequacy:       Satisfactory for evaluation, endocervical/transformation zone component present    HPV Reflex? Yes regardless of result     High Risk? No     Last Mens Period unknown     Lab AP Abnormal Bleeding No     Lab AP Patient Status NA     Lab AP Birth Control/Hormones None     Lab AP Previous Normal 3/8/2013     Lab AP Previous Abnl None     Lab AP Cervical Appearance normal     Narrative    Test performed by:  [facility]  [address] [place] [address]   HPV [place] PCR ([employer]   Result Value Ref Range    HPV Source SurePath     HPV 16 DNA Negative NEG    HPV 18 DNA Negative NEG    Other HR HPV Negative NEG    Final Diagnosis SEE NOTES       Comment:      This patient's sample is negative for HPV DNA.  This test was developed and its performance characteristics determined by the  [facility], [facility]. It  has not been cleared or approved by the FDA. The laboratory is regulated under  CLIA as qualified to perform high-complexity testing. This test is used for  clinical purposes. It should not be regarded as investigational or for  research.  (Note)  METHODOLOGY:  The Roche cynthia 4800 system uses automated extraction,  simultaneous amplification of HPV (L1 region) and beta-globin,  followed by  real time detection of fluorescent labeled HPV and beta  globin using specific oligonucleotide probes . The test specifically  identifies types HPV 16 DNA and HPV 18 DNA while concurrently  detecting the rest of the high risk types (31, 33, 35, 39, 45, 51,  52, 56, 58, 59, 66 or 68).  COMMENTS:  This test is not intended for use as a screening device  for women under age 30 with normal cervical cy tology.  Results should  be correlated with cytologic and histologic findings. Close clinical  followup is recommended.      Specimen Description Cervical Cells       Comment:      Performed and/or entered by:  [facility]  [address] [place] [address]       Narrative    Test performed by:  [facility]  [address] [place] [address] [address], [place] [address]       If you have any questions or concerns, please call the clinic at the number listed above.       Sincerely,      Nellie Harman MD

## 2021-01-26 NOTE — NURSING NOTE
Due to patient being non-English speaking/uses sign language, an  was used for this visit. Only for face-to-face interpretation by an external agency, date and length of interpretation can be found on the scanned worksheet.       name: Josh Mendoza (Htoo)  Language: Danna  Agency:  [employer]  Phone number: [phone]  Type of interpretation:  Face-to-face, spoken

## 2021-01-27 LAB
FINAL DIAGNOSIS: NORMAL
HPV HR 12 DNA CVX QL NAA+PROBE: NEGATIVE
HPV16 DNA SPEC QL NAA+PROBE: NEGATIVE
HPV18 DNA SPEC QL NAA+PROBE: NEGATIVE
SPECIMEN DESCRIPTION: NORMAL
SPECIMEN SOURCE CVX/VAG CYTO: NORMAL

## 2021-01-28 ENCOUNTER — TRANSFERRED RECORDS (OUTPATIENT)
Dept: MULTI SPECIALTY CLINIC | Facility: CLINIC | Age: 51
End: 2021-01-28

## 2021-01-28 DIAGNOSIS — Z00.00 ROUTINE GENERAL MEDICAL EXAMINATION AT A HEALTH CARE FACILITY: ICD-10-CM

## 2021-01-28 LAB
HEMOCCULT STL QL IA: NEGATIVE
HEMOCCULT STL QL IA: NEGATIVE

## 2021-01-28 PROCEDURE — G0328 FECAL BLOOD SCRN IMMUNOASSAY: HCPCS

## 2021-01-28 NOTE — LETTER
February 1, 2021      Saman Enrique  [address] [facility] [address]        Dear Ms.Klaus,    We are writing to inform you of your test results.    Your FIT test for colon cancer came back looking normal.  No concerns.  Please call the clinic at [phone] if you have any questions.       Resulted Orders   Fecal Occult Blood - FIT, iFOB ([facility])   Result Value Ref Range    Occult Blood Scn FIT Negative Negative       If you have any questions or concerns, please call the clinic at the number listed above.       Sincerely,      Nellie Harman MD

## 2021-01-29 NOTE — RESULT ENCOUNTER NOTE
Po Pree-    Your FIT test for colon cancer came back looking normal.  No concerns.  Please call the clinic at [phone] if you have any questions.      Nellie Harman MD    Please send results to patient.

## 2021-02-02 ENCOUNTER — RECORDS - HEALTHEAST (OUTPATIENT)
Dept: ADMINISTRATIVE | Facility: OTHER | Age: 51
End: 2021-02-02

## 2021-02-02 LAB
CYTOLOGY CVX/VAG DOC THIN PREP: NORMAL
HIGH RISK?: NO
HPV REFLEX?: NORMAL
LAB AP ABNORMAL BLEEDING: NO
LAB AP BIRTH CONTROL/HORMONES: NORMAL
LAB AP CERVICAL APPEARANCE: NORMAL
LAB AP PATIENT STATUS: NORMAL
LAB AP PREVIOUS ABNL: NORMAL
LAB AP PREVIOUS NORMAL: NORMAL
LAST MENS PERIOD: NORMAL
PATH REPORT.COMMENTS IMP SPEC: NORMAL
PATH REPORT.COMMENTS IMP SPEC: NORMAL
SPECIMEN ADEQUACY:: NORMAL

## 2021-02-03 NOTE — RESULT ENCOUNTER NOTE
Po Pree-    Your pap smear is normal.  This is good news.  Please call the clinic at [phone] if you have any questions.      Nellie Harman MD    Please send results to patient.

## 2021-04-07 DIAGNOSIS — G62.9 NEUROPATHY: ICD-10-CM

## 2021-04-07 RX ORDER — GABAPENTIN 100 MG/1
100 CAPSULE ORAL 3 TIMES DAILY PRN
Qty: 90 CAPSULE | Refills: 1 | Status: SHIPPED | OUTPATIENT
Start: 2021-04-07 | End: 2021-09-29

## 2021-05-31 ENCOUNTER — RECORDS - HEALTHEAST (OUTPATIENT)
Dept: ADMINISTRATIVE | Facility: CLINIC | Age: 51
End: 2021-05-31

## 2021-06-02 ENCOUNTER — RECORDS - HEALTHEAST (OUTPATIENT)
Dept: ADMINISTRATIVE | Facility: CLINIC | Age: 51
End: 2021-06-02

## 2021-06-08 ENCOUNTER — RECORDS - HEALTHEAST (OUTPATIENT)
Dept: FAMILY MEDICINE | Facility: CLINIC | Age: 51
End: 2021-06-08

## 2021-06-08 DIAGNOSIS — K76.0 FATTY (CHANGE OF) LIVER, NOT ELSEWHERE CLASSIFIED: ICD-10-CM

## 2021-06-08 DIAGNOSIS — R52 PAIN, UNSPECIFIED: ICD-10-CM

## 2021-06-13 NOTE — PROGRESS NOTES
Met with patient at [facility] and assisted in reapplying for Medical Assistance. Danna  though the Language Line was used. Pt states that she applied back in April. Telephone call to [facility], that application was denied as patient had coverage at that time. Completed Paper MNSURE application and faxed to [facility]. Also faxed needed verifications. Please allow 2 to 3 weeks for case to be worked on at [facility]. Informed Pt of this, and she states she understands. FSW will call patient was MA is approved. Anticipate no further needs.

## 2021-07-21 ENCOUNTER — RECORDS - HEALTHEAST (OUTPATIENT)
Dept: ADMINISTRATIVE | Facility: CLINIC | Age: 51
End: 2021-07-21

## 2021-09-27 ENCOUNTER — HOSPITAL ENCOUNTER (EMERGENCY)
Facility: HOSPITAL | Age: 51
Discharge: HOME | End: 2021-09-27
Admitting: PHYSICIAN ASSISTANT
Payer: COMMERCIAL

## 2021-09-27 VITALS
TEMPERATURE: 98.1 F | OXYGEN SATURATION: 100 % | RESPIRATION RATE: 16 BRPM | HEART RATE: 108 BPM | BODY MASS INDEX: 25.13 KG/M2 | WEIGHT: 151 LBS | SYSTOLIC BLOOD PRESSURE: 149 MMHG | DIASTOLIC BLOOD PRESSURE: 69 MMHG

## 2021-09-27 DIAGNOSIS — R42 DIZZINESS: ICD-10-CM

## 2021-09-27 LAB
ANION GAP SERPL CALCULATED.3IONS-SCNC: 4 MMOL/L (ref 5–18)
BUN SERPL-MCNC: 12 MG/DL (ref 8–22)
CALCIUM SERPL-MCNC: 10 MG/DL (ref 8.5–10.5)
CHLORIDE BLD-SCNC: 110 MMOL/L (ref 98–107)
CO2 SERPL-SCNC: 30 MMOL/L (ref 22–31)
CREAT SERPL-MCNC: 0.82 MG/DL (ref 0.6–1.1)
ERYTHROCYTE [DISTWIDTH] IN BLOOD BY AUTOMATED COUNT: 12.3 % (ref 10–15)
GFR SERPL CREATININE-BSD FRML MDRD: 82 ML/MIN/1.73M2
GLUCOSE BLD-MCNC: 113 MG/DL (ref 70–125)
HCT VFR BLD AUTO: 43.6 % (ref 35–47)
HGB BLD-MCNC: 13.7 G/DL (ref 11.7–15.7)
MAGNESIUM SERPL-MCNC: 2.3 MG/DL (ref 1.8–2.6)
MCH RBC QN AUTO: 29.9 PG (ref 26.5–33)
MCHC RBC AUTO-ENTMCNC: 31.4 G/DL (ref 31.5–36.5)
MCV RBC AUTO: 95 FL (ref 78–100)
PLATELET # BLD AUTO: 276 10E3/UL (ref 150–450)
POTASSIUM BLD-SCNC: 3.9 MMOL/L (ref 3.5–5)
RBC # BLD AUTO: 4.58 10E6/UL (ref 3.8–5.2)
SODIUM SERPL-SCNC: 144 MMOL/L (ref 136–145)
TROPONIN I SERPL-MCNC: <0.01 NG/ML (ref 0–0.29)
WBC # BLD AUTO: 6.7 10E3/UL (ref 4–11)

## 2021-09-27 PROCEDURE — 83735 ASSAY OF MAGNESIUM: CPT | Performed by: PHYSICIAN ASSISTANT

## 2021-09-27 PROCEDURE — 84484 ASSAY OF TROPONIN QUANT: CPT | Performed by: PHYSICIAN ASSISTANT

## 2021-09-27 PROCEDURE — 250N000013 HC RX MED GY IP 250 OP 250 PS 637: Performed by: PHYSICIAN ASSISTANT

## 2021-09-27 PROCEDURE — 99284 EMERGENCY DEPT VISIT MOD MDM: CPT

## 2021-09-27 PROCEDURE — 36415 COLL VENOUS BLD VENIPUNCTURE: CPT | Performed by: PHYSICIAN ASSISTANT

## 2021-09-27 PROCEDURE — 85027 COMPLETE CBC AUTOMATED: CPT | Performed by: PHYSICIAN ASSISTANT

## 2021-09-27 PROCEDURE — 93005 ELECTROCARDIOGRAM TRACING: CPT | Performed by: PHYSICIAN ASSISTANT

## 2021-09-27 PROCEDURE — 80048 BASIC METABOLIC PNL TOTAL CA: CPT | Performed by: PHYSICIAN ASSISTANT

## 2021-09-27 RX ORDER — MECLIZINE HCL 12.5 MG 12.5 MG/1
25 TABLET ORAL ONCE
Status: COMPLETED | OUTPATIENT
Start: 2021-09-27 | End: 2021-09-27

## 2021-09-27 RX ORDER — MECLIZINE HYDROCHLORIDE 25 MG/1
25 TABLET ORAL 3 TIMES DAILY PRN
Qty: 15 TABLET | Refills: 0 | Status: SHIPPED | OUTPATIENT
Start: 2021-09-27 | End: 2023-02-03

## 2021-09-27 RX ADMIN — MECLIZINE HCL 12.5 MG 25 MG: 12.5 TABLET ORAL at 12:42

## 2021-09-27 ASSESSMENT — ENCOUNTER SYMPTOMS
NAUSEA: 0
LIGHT-HEADEDNESS: 1
FACIAL ASYMMETRY: 0
DIZZINESS: 1
SHORTNESS OF BREATH: 0
VOMITING: 0
HEADACHES: 0
SPEECH DIFFICULTY: 0

## 2021-09-27 NOTE — ED TRIAGE NOTES
Pt was using  this am around 0900 when she breathed in a bit of the . C/o some nausea and some dizziness. She has been drinking water without problems. Used Danna  for interview

## 2021-09-27 NOTE — ED PROVIDER NOTES
EMERGENCY DEPARTMENT ENCOUNTER      NAME: Saman Enrique  AGE: 51 year old female  YOB: 1970  MRN: [id number]  EVALUATION DATE & TIME: No admission date for patient encounter.    PCP: Nellie Harman    ED PROVIDER: Katie Varela PA-C      Chief Complaint   Patient presents with     nausea/ h/a after breathing in          FINAL IMPRESSION:  Dizziness    MEDICAL DECISION MAKING:    Pertinent Labs & Imaging studies reviewed. (See chart for details)  51 year old female presents to the Emergency Department for evaluation of dizziness and light headedness after inhaling Clorox  this morning. She denies vision changes, chest pain, shortness of breath, nausea, vomiting, facial droop, slurred speech, extremity weakness, paresthesias, headache.    Vitals reviewed and notable for elevated blood pressure and borderline tachycardia. Heart rate has normalized by time of exam. She has no focal neuro deficits. Steady gait, ambulatory room to room on her own. No nystagmus. 5/5 strength of bilateral upper and lower extremities. TMs normal bilaterally. Differential diagnoses includes but not limited to ACS, CVA, BPPV, chemical inhalation, labyrinthitis, Ménière's disease, vestibular neuritis, migraine, Multiple Sclerosis, Otitis media, viral syndrome as well as other etiologies.    Labs unremarkable. EKG without acute ischemic changes. Troponin not elevated. No chest pain or shortness of breath. Very low suspicion for ACS. Symptoms have completely resolved with meclizine. Symptoms most consistent with a peripheral cause, likely BPPV. Low concern for dangerous chemical inhalation. No history of recent infection so doubt vestibular neuritis. History not consistent with meniere's disease. No history of trauma. No red flag features for central vertigo to include gradual onset, vertical/bidirectional or non-fatigable nystagmus, focal neurologic findings on exam (including inability to ambulate, ataxia,  dysmetria). Presentation not consistent with an acute CNS infection, vertebral basilar artery insufficiency, cerebellar hemorrhage or infarction, intracranial mass or bleed. Meclizine prescribed for home. Advised to follow up with her PCP. Discussed return precautions and discharged in stable condition.     0 minutes of critical care time     ED COURSE:  12:10 PM I met with the patient, obtained history, performed an initial exam, and discussed options and plan for diagnostics and treatment here in the ED.  1:58 PM Patient's symptoms have resolved. Discussed results. Patient discharged after being provided with extensive anticipatory guidance and given return precautions, importance of PCP follow-up emphasized.    At the conclusion of the encounter I discussed the results of all of the tests and the disposition. The questions were answered. The patient and family acknowledged understanding and were agreeable with the care plan.     MEDICATIONS GIVEN IN THE EMERGENCY:  Medications   meclizine (ANTIVERT) tablet 25 mg (25 mg Oral Given 9/27/21 1242)       NEW PRESCRIPTIONS STARTED AT TODAY'S ER VISIT  New Prescriptions    No medications on file            =================================================================    HPI:    Patient information was obtained from: Patient    Use of Interpretor: Yes (Phone) - Language: Danna Enrique is a 51 year old female with a pertinent history of congenital heart disease who presents to this ED via walk in for evaluation of dizziness.    Patient reports she was cleaning this morning at ~9:00 AM with Clorox  when she began to feel dizzy two hours later (11:00 AM). She states the dizziness is constant and describes it as lightheadedness and room-spinning. Patient is worried her symptoms were caused by inhaling the . She reports the dizziness improves slightly with sitting down. She also states it feels like her blood pressure has increased.  Denies history or stroke and heart problems. Denies chest pain, shortness of breath, headache, nausea, vomiting, blurry vision, diplopia, facial droop, slurred speech, tinnitus, extremity weakness, fevers, chills or any other complaints or concerns at this time.     REVIEW OF SYSTEMS:  Review of Systems   Constitutional: Negative for chills and fever.   HENT: Negative for tinnitus.    Eyes: Negative for visual disturbance.        Negative for diplopia    Respiratory: Negative for shortness of breath.    Cardiovascular: Negative for chest pain.   Gastrointestinal: Negative for nausea and vomiting.   Neurological: Positive for dizziness and light-headedness. Negative for facial asymmetry, speech difficulty, weakness and headaches.   All other systems reviewed and are negative.    PAST MEDICAL HISTORY:  Past Medical History:   Diagnosis Date     CVA (cerebral infarction)      Hepatitis B immune      History of blood transfusion      History of chicken pox        PAST SURGICAL HISTORY:  Past Surgical History:   Procedure Laterality Date     CYST REMOVAL             CURRENT MEDICATIONS:    No current facility-administered medications for this encounter.    Current Outpatient Medications:      ACETAMINOPHEN EXTRA STRENGTH 500 MG tablet, TAKE 1 TO 2 TABLETS(500 TO 1000 MG) BY MOUTH EVERY 6 HOURS AS NEEDED FOR MILD PAIN, Disp: 60 tablet, Rfl: 1     clotrimazole (LOTRIMIN) 1 % external cream, Apply topically 2 times daily, Disp: 15 g, Rfl: 0     gabapentin (NEURONTIN) 100 MG capsule, Take 1 capsule (100 mg) by mouth 3 times daily as needed (pain), Disp: 90 capsule, Rfl: 1     senna (SENOKOT) 8.6 MG tablet, Take 1 tablet by mouth 2 times daily as needed for constipation, Disp: 60 tablet, Rfl: 3     Simethicone 125 MG CAPS, Take 1 dose. by mouth 3 times daily as needed (gas pain), Disp: 30 capsule, Rfl: 1     Vitamin D3 (CHOLECALCIFEROL) 25 mcg (1000 units) tablet, Take 2 tablets (50 mcg) by mouth daily, Disp: 30 tablet, Rfl:  11      ALLERGIES:  Allergies   Allergen Reactions     Nkda [No Known Drug Allergies]        FAMILY HISTORY:  Family History   Problem Relation Age of Onset     Diabetes Other         mother in law     Coronary Artery Disease No family hx of      Hypertension No family hx of      Hyperlipidemia No family hx of      Cerebrovascular Disease No family hx of      Breast Cancer No family hx of      Colon Cancer No family hx of      Prostate Cancer No family hx of      Other Cancer No family hx of      Depression No family hx of      Anxiety Disorder No family hx of      Mental Illness No family hx of      Substance Abuse No family hx of      Anesthesia Reaction No family hx of      Asthma No family hx of      Osteoporosis No family hx of      Genetic Disorder No family hx of      Thyroid Disease No family hx of      Obesity No family hx of      Unknown/Adopted No family hx of      No Known Problems Mother      Kidney Disease Father        SOCIAL HISTORY:   Social History     Socioeconomic History     Marital status:      Spouse name: Not on file     Number of children: Not on file     Years of education: Not on file     Highest education level: Not on file   Occupational History     Not on file   Tobacco Use     Smoking status: Never Smoker     Smokeless tobacco: Never Used   Substance and Sexual Activity     Alcohol use: No     Drug use: No     Sexual activity: Not on file   Other Topics Concern     Not on file   Social History Narrative     Not on file     Social Determinants of Health     Financial Resource Strain:      Difficulty of Paying Living Expenses:    Food Insecurity:      Worried About Running Out of Food in the Last Year:      Ran Out of Food in the Last Year:    Transportation Needs:      Lack of Transportation (Medical):      Lack of Transportation (Non-Medical):    Physical Activity:      Days of Exercise per Week:      Minutes of Exercise per Session:    Stress:      Feeling of Stress :    Social  Connections:      Frequency of Communication with Friends and Family:      Frequency of Social Gatherings with Friends and Family:      Attends Caodaism Services:      Active Member of Clubs or Organizations:      Attends Club or Organization Meetings:      Marital Status:    Intimate Partner Violence:      Fear of Current or Ex-Partner:      Emotionally Abused:      Physically Abused:      Sexually Abused:        VITALS:  Patient Vitals for the past 24 hrs:   BP Temp Temp src Pulse Resp SpO2 Weight   09/27/21 1310 -- -- -- -- -- -- 68.5 kg (151 lb)   09/27/21 1152 (!) 149/69 98.1  F (36.7  C) Oral 108 16 100 % --       PHYSICAL EXAM    Constitutional: Well developed, Well nourished, NAD.  HENT: Normocephalic, Atraumatic, Bilateral external ears normal. TMs normal bilaterally. Oropharynx normal, mucous membranes moist, Nose normal.   Neck: Normal range of motion, No tenderness, Supple, No stridor.   Eyes: Conjunctiva normal, No discharge.   Respiratory: Normal breath sounds, No respiratory distress, No wheezing, Speaks full sentences easily. No cough.   Cardiovascular: Normal heart rate, Regular rhythm, No murmurs, No rubs, No gallops. Chest wall nontender.   GI: Soft, No tenderness, No masses, No flank tenderness. No rebound or guarding.    Musculoskeletal: 2+ DP pulses. No edema. No cyanosis, No clubbing. Good range of motion in all major joints. No tenderness to palpation or major deformities noted. No tenderness of the CTLS spine.   Integument: Warm, Dry, No erythema, No rash. No petechiae.  Neurologic: Patient is alert and oriented ×3. Face is symmetric. Speech is normal. Visual fields are full. Cranial nerves II through XII are intact. Strength is full and equal in both upper and lower extremities. Sensory is intact to sharp and light touch. Patient has a normal steady gait. Coordination is intact. Normal Romberg and finger nose to finger.  Psychiatric: Affect normal, Judgment normal, Mood normal.  Cooperative.     LAB:  All pertinent labs reviewed and interpreted.  Recent Results (from the past 24 hour(s))   CBC (+ platelets, no diff)    Collection Time: 09/27/21 12:39 PM   Result Value Ref Range    WBC Count 6.7 4.0 - 11.0 10e3/uL    RBC Count 4.58 3.80 - 5.20 10e6/uL    Hemoglobin 13.7 11.7 - 15.7 g/dL    Hematocrit 43.6 35.0 - 47.0 %    MCV 95 78 - 100 fL    MCH 29.9 26.5 - 33.0 pg    MCHC 31.4 (L) 31.5 - 36.5 g/dL    RDW 12.3 10.0 - 15.0 %    Platelet Count 276 150 - 450 10e3/uL   Basic metabolic panel    Collection Time: 09/27/21 12:39 PM   Result Value Ref Range    Sodium 144 136 - 145 mmol/L    Potassium 3.9 3.5 - 5.0 mmol/L    Chloride 110 (H) 98 - 107 mmol/L    Carbon Dioxide (CO2) 30 22 - 31 mmol/L    Anion Gap 4 (L) 5 - 18 mmol/L    Urea Nitrogen 12 8 - 22 mg/dL    Creatinine 0.82 0.60 - 1.10 mg/dL    Calcium 10.0 8.5 - 10.5 mg/dL    Glucose 113 70 - 125 mg/dL    GFR Estimate 82 >60 mL/min/1.73m2   Troponin I (now)    Collection Time: 09/27/21 12:39 PM   Result Value Ref Range    Troponin I <0.01 0.00 - 0.29 ng/mL   Magnesium    Collection Time: 09/27/21 12:39 PM   Result Value Ref Range    Magnesium 2.3 1.8 - 2.6 mg/dL         EKG:    Performed at: 12:43    Impression: Sinus rhythm.    Rate: 95 bpm  Rhythm: Sinus rhythm  Axis: 15  WA Interval: 176 ms  QRS Interval: 96 ms  QTc Interval: 427 ms  ST Changes: No acute ST elevations or depressions. No specific T wave abnormality in V3-V4.  Comparison: When compared with ECG of 07/15/2011 no significant change was found.     I have independently reviewed and interpreted the EKG(s) documented above.  Reviewed with Sia Manuel, am serving as a scribe to document services personally performed by Katie Varela PA-C based on my observation and the provider's statements to me. I, Katie Varela PA-C attest that Sia Dodd is acting in a scribe capacity, has observed my performance of the services and has documented them in  accordance with my direction.    Katie Varela PA-C  Emergency Medicine  [facility]  9/27/2021     Katie Varela PA-C  09/28/21 4288

## 2021-09-27 NOTE — DISCHARGE INSTRUCTIONS
Unclear cause for your dizziness. Could be from inhaling the cleaning chemicals versus vertigo. Given you are feeling better after medications we will discharge you home. I have sent this medication to your pharmacy in event your symptoms return. Return with new or worsening symptoms.

## 2021-09-28 LAB
ATRIAL RATE - MUSE: 95 BPM
DIASTOLIC BLOOD PRESSURE - MUSE: NORMAL MMHG
INTERPRETATION ECG - MUSE: NORMAL
P AXIS - MUSE: 32 DEGREES
PR INTERVAL - MUSE: 176 MS
QRS DURATION - MUSE: 96 MS
QT - MUSE: 340 MS
QTC - MUSE: 427 MS
R AXIS - MUSE: 15 DEGREES
SYSTOLIC BLOOD PRESSURE - MUSE: NORMAL MMHG
T AXIS - MUSE: 0 DEGREES
VENTRICULAR RATE- MUSE: 95 BPM

## 2021-09-28 ASSESSMENT — ENCOUNTER SYMPTOMS
WEAKNESS: 0
FEVER: 0
CHILLS: 0

## 2021-09-29 ENCOUNTER — OFFICE VISIT (OUTPATIENT)
Dept: FAMILY MEDICINE | Facility: CLINIC | Age: 51
End: 2021-09-29
Payer: COMMERCIAL

## 2021-09-29 VITALS
RESPIRATION RATE: 16 BRPM | HEART RATE: 79 BPM | TEMPERATURE: 98.7 F | DIASTOLIC BLOOD PRESSURE: 83 MMHG | SYSTOLIC BLOOD PRESSURE: 146 MMHG | OXYGEN SATURATION: 98 %

## 2021-09-29 DIAGNOSIS — J06.9 VIRAL UPPER RESPIRATORY TRACT INFECTION: ICD-10-CM

## 2021-09-29 DIAGNOSIS — H81.12 BENIGN PAROXYSMAL POSITIONAL VERTIGO OF LEFT EAR: Primary | ICD-10-CM

## 2021-09-29 DIAGNOSIS — G62.9 NEUROPATHY: ICD-10-CM

## 2021-09-29 PROCEDURE — 99214 OFFICE O/P EST MOD 30 MIN: CPT | Performed by: STUDENT IN AN ORGANIZED HEALTH CARE EDUCATION/TRAINING PROGRAM

## 2021-09-29 RX ORDER — GABAPENTIN 100 MG/1
100 CAPSULE ORAL 3 TIMES DAILY PRN
Qty: 90 CAPSULE | Refills: 1 | Status: SHIPPED | OUTPATIENT
Start: 2021-09-29 | End: 2022-06-06

## 2021-09-29 RX ORDER — FAMOTIDINE 40 MG/1
40 TABLET, FILM COATED ORAL DAILY
Qty: 30 TABLET | Refills: 0 | Status: SHIPPED | OUTPATIENT
Start: 2021-09-29 | End: 2021-10-28

## 2021-09-29 RX ORDER — ONDANSETRON 4 MG/1
4 TABLET, ORALLY DISINTEGRATING ORAL EVERY 8 HOURS PRN
Qty: 10 TABLET | Refills: 0 | Status: SHIPPED | OUTPATIENT
Start: 2021-09-29 | End: 2022-04-26

## 2021-09-29 RX ORDER — ACETAMINOPHEN 500 MG
1000 TABLET ORAL EVERY 6 HOURS PRN
Qty: 60 TABLET | Refills: 1 | Status: SHIPPED | OUTPATIENT
Start: 2021-09-29 | End: 2022-02-23

## 2021-09-29 NOTE — PROGRESS NOTES
There are no exam notes on file for this visit.    ASSESSMENT AND PLAN:      Po was seen today for follow up.    Diagnoses and all orders for this visit:    Benign paroxysmal positional vertigo of left ear.  Symptoms consistent with BPPV.  Continue meclizine.  I also prescribed some famotidine for both GERD and dizziness, and Zofran for nausea.  -     famotidine (PEPCID) 40 MG tablet; Take 1 tablet (40 mg) by mouth daily  -     ondansetron (ZOFRAN-ODT) 4 MG ODT tab; Take 1 tablet (4 mg) by mouth every 8 hours as needed for nausea  -     acetaminophen (ACETAMINOPHEN EXTRA STRENGTH) 500 MG tablet; Take 2 tablets (1,000 mg) by mouth every 6 hours as needed for mild pain    Neuropathy.  Refill Tylenol and gabapentin for back pain.  -     gabapentin (NEURONTIN) 100 MG capsule; Take 1 capsule (100 mg) by mouth 3 times daily as needed (pain)  -     acetaminophen (ACETAMINOPHEN EXTRA STRENGTH) 500 MG tablet; Take 2 tablets (1,000 mg) by mouth every 6 hours as needed for mild pain      Patient was instructed to follow-up in clinic if she is not feeling better by next week.  Options include vestibular physical therapy, or further intervention if symptoms are not improving.  Provided patient with reassurance that symptoms are not likely secondary to the chemical exposure and that this is a different problem.    Patient Instructions   Famotidine:  For acid reflux  Take 1 pill daily in the morning.    Zofran:  For nausea or vomiting  Take 1 pill under tongue     Keep taking the meclizine 3x a day for dizziness.      Tylenol and gabapentin to help with pain.     You should feel a lot better in the next couple of days.     Come back next week if not feeling better.        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  Patient's  also present at visit.     Presents for   Chief Complaint   Patient presents with     Follow Up     Pt is here to follow up from having gone to the ER for dizziness.  She states she was using cleaning supplies and began to feel dizzy.  She states she is still experiencing the dizziness.      Patient presents for ER follow-up visit.  She was seen 2 days ago, after a cleaning solution exposure, where she developed acute dizziness, experiencing a heavy head, and feeling like her blood pressure was high and blood was pulsating into her head.  She was prescribed meclizine, and recently picked this up.  Had been feeling better over the last couple of days, and then symptoms resumed and worsened today.  She describes feeling a \"heavy head, feels like her head is spinning, but not that the room is spinning.\"  She feels off balance with walking.  It is worse with turning her head.  She notes her eyes are blurry but does not think this is from the dizziness.  She does feel some sour taste and burning in her chest.  No belly pain.  Feels nauseous but no vomiting.  No shortness of breath, she is eating and drinking okay, no ear trouble, no nasal congestion, no recent URI symptoms.      She is worried that her blood pressure is too high.      She continues to have back and joint pain, and has run out of her medications for this.  Is wondering if we could refill them today.    The meclizine she received at the hospital is been helpful and she starting to take it 3 times per day as instructed.  Has taken 2 doses with improvement in symptoms.        OBJECTIVE:  Vitals: BP (!) 146/83   Pulse 79   Temp 98.7  F (37.1  C) (Oral)   Resp 16   LMP 01/18/2015 (Approximate)   " SpO2 98%   BMI= There is no height or weight on file to calculate BMI.  Objective:    Vitals:  Vitals are reviewed and are within the normal range.  BP mildly elevated.    Gen: Patient appears uncomfortable and closes her eyes frequently during the visit.  HEENT: Tympanic membranes viewed bilaterally and are normal.  There is some irritation in her ear canals but no evidence of otitis externa.  She has no maxillary sinus tenderness.  Nose is clear.  No palpable anterior posterior cervical lymphadenopathy, no tonsillar lymphadenopathy, throat is clear.  Eye movements show nystagmus present in the left eye with horizontal movement.    Cardiac:  Regular rate and rhythm, no murmurs, rubs or gallops  Respiratory:  Lungs clear to auscultation bilaterally  Back: She has some pain just below her scapula, as well as some paraspinous tenderness on the right side.  Has had pain in this area before.  Extremities:  Warm, well-perfused, pulses 2+/4, no lower extremity edema    Krunal-Hallpike and Epley maneuver was completed with improvement in symptoms.    No results found for any visits on 09/29/21.        Patient Instructions   Famotidine:  For acid reflux  Take 1 pill daily in the morning.    Zofran:  For nausea or vomiting  Take 1 pill under tongue     Keep taking the meclizine 3x a day for dizziness.      Tylenol and gabapentin to help with pain.     You should feel a lot better in the next couple of days.     Come back next week if not feeling better.        Nellie Harman MD

## 2021-09-29 NOTE — PATIENT INSTRUCTIONS
Famotidine:  For acid reflux  Take 1 pill daily in the morning.    Zofran:  For nausea or vomiting  Take 1 pill under tongue     Keep taking the meclizine 3x a day for dizziness.      Tylenol and gabapentin to help with pain.     You should feel a lot better in the next couple of days.     Come back next week if not feeling better.    
endoscopy

## 2021-10-28 DIAGNOSIS — H81.12 BENIGN PAROXYSMAL POSITIONAL VERTIGO OF LEFT EAR: ICD-10-CM

## 2021-10-28 RX ORDER — FAMOTIDINE 40 MG/1
TABLET, FILM COATED ORAL
Qty: 30 TABLET | Refills: 0 | Status: SHIPPED | OUTPATIENT
Start: 2021-10-28 | End: 2022-03-29

## 2022-02-23 ENCOUNTER — OFFICE VISIT (OUTPATIENT)
Dept: FAMILY MEDICINE | Facility: CLINIC | Age: 52
End: 2022-02-23
Payer: COMMERCIAL

## 2022-02-23 VITALS
HEART RATE: 86 BPM | RESPIRATION RATE: 16 BRPM | DIASTOLIC BLOOD PRESSURE: 84 MMHG | WEIGHT: 154.6 LBS | OXYGEN SATURATION: 98 % | BODY MASS INDEX: 27.39 KG/M2 | SYSTOLIC BLOOD PRESSURE: 128 MMHG | TEMPERATURE: 98 F | HEIGHT: 63 IN

## 2022-02-23 DIAGNOSIS — R52 PAIN: ICD-10-CM

## 2022-02-23 DIAGNOSIS — R20.2 NUMBNESS AND TINGLING OF RIGHT ARM AND LEG: Primary | ICD-10-CM

## 2022-02-23 DIAGNOSIS — R14.0 BLOATING: ICD-10-CM

## 2022-02-23 DIAGNOSIS — E55.9 VITAMIN D DEFICIENCY: ICD-10-CM

## 2022-02-23 DIAGNOSIS — M54.2 NECK PAIN: ICD-10-CM

## 2022-02-23 DIAGNOSIS — R20.0 NUMBNESS AND TINGLING OF RIGHT ARM AND LEG: Primary | ICD-10-CM

## 2022-02-23 DIAGNOSIS — J02.9 SORE THROAT: ICD-10-CM

## 2022-02-23 PROCEDURE — 99213 OFFICE O/P EST LOW 20 MIN: CPT | Mod: GC | Performed by: STUDENT IN AN ORGANIZED HEALTH CARE EDUCATION/TRAINING PROGRAM

## 2022-02-23 RX ORDER — VITAMIN B COMPLEX
50 TABLET ORAL DAILY
Qty: 30 TABLET | Refills: 11 | Status: SHIPPED | OUTPATIENT
Start: 2022-02-23 | End: 2023-02-03

## 2022-02-23 RX ORDER — SIMETHICONE 125 MG
1 CAPSULE ORAL 3 TIMES DAILY PRN
Qty: 30 CAPSULE | Refills: 1 | Status: SHIPPED | OUTPATIENT
Start: 2022-02-23 | End: 2022-04-26

## 2022-02-23 RX ORDER — ACETAMINOPHEN 500 MG
1000 TABLET ORAL EVERY 6 HOURS PRN
Qty: 60 TABLET | Refills: 1 | Status: SHIPPED | OUTPATIENT
Start: 2022-02-23 | End: 2022-04-26

## 2022-02-23 NOTE — PROGRESS NOTES
Preceptor Attestation:    I discussed the patient with the resident and evaluated the patient in person. I have verified the content of the note, which accurately reflects my assessment of the patient and the plan of care.   Supervising Physician:  Ike Michaud MD.

## 2022-02-23 NOTE — NURSING NOTE
Due to patient being non-English speaking/uses sign language, an  was used for this visit. Only for face-to-face interpretation by an external agency, date and length of interpretation can be found on the scanned worksheet.     name: Lindy Perdomo  Agency: [employer]  Language: Danna   Telephone number:   Type of interpretation: Telephone, spoken

## 2022-03-29 ENCOUNTER — OFFICE VISIT (OUTPATIENT)
Dept: FAMILY MEDICINE | Facility: CLINIC | Age: 52
End: 2022-03-29
Payer: COMMERCIAL

## 2022-03-29 ENCOUNTER — ANCILLARY PROCEDURE (OUTPATIENT)
Dept: GENERAL RADIOLOGY | Facility: CLINIC | Age: 52
End: 2022-03-29
Attending: STUDENT IN AN ORGANIZED HEALTH CARE EDUCATION/TRAINING PROGRAM
Payer: COMMERCIAL

## 2022-03-29 VITALS
HEART RATE: 73 BPM | WEIGHT: 150.6 LBS | RESPIRATION RATE: 16 BRPM | DIASTOLIC BLOOD PRESSURE: 85 MMHG | TEMPERATURE: 98.3 F | BODY MASS INDEX: 26.68 KG/M2 | SYSTOLIC BLOOD PRESSURE: 141 MMHG | OXYGEN SATURATION: 99 %

## 2022-03-29 DIAGNOSIS — R07.89 CHEST WALL PAIN: ICD-10-CM

## 2022-03-29 DIAGNOSIS — R07.89 CHEST WALL PAIN: Primary | ICD-10-CM

## 2022-03-29 DIAGNOSIS — H81.12 BENIGN PAROXYSMAL POSITIONAL VERTIGO OF LEFT EAR: ICD-10-CM

## 2022-03-29 PROCEDURE — 99214 OFFICE O/P EST MOD 30 MIN: CPT | Performed by: STUDENT IN AN ORGANIZED HEALTH CARE EDUCATION/TRAINING PROGRAM

## 2022-03-29 PROCEDURE — 71046 X-RAY EXAM CHEST 2 VIEWS: CPT | Mod: FY | Performed by: RADIOLOGY

## 2022-03-29 RX ORDER — BENZOCAINE/MENTHOL 6 MG-10 MG
LOZENGE MUCOUS MEMBRANE 2 TIMES DAILY
Qty: 15 G | Refills: 0 | Status: SHIPPED | OUTPATIENT
Start: 2022-03-29 | End: 2022-04-26

## 2022-03-29 RX ORDER — FAMOTIDINE 40 MG/1
40 TABLET, FILM COATED ORAL DAILY
Qty: 30 TABLET | Refills: 0 | Status: SHIPPED | OUTPATIENT
Start: 2022-03-29 | End: 2022-04-25

## 2022-03-29 RX ORDER — CYCLOBENZAPRINE HCL 10 MG
10 TABLET ORAL
Qty: 30 TABLET | Refills: 0 | Status: SHIPPED | OUTPATIENT
Start: 2022-03-29 | End: 2022-04-26

## 2022-03-29 NOTE — NURSING NOTE
Critical Vital Report    Did patient have a critical vital during today's visit? Yes  Which vital is reporting as critical?: Blood Pressure    I personally notified the following: Provider    Action(s) taken: I left room and notified provider personally.    Iris Looney, [facility]

## 2022-03-29 NOTE — NURSING NOTE
Due to patient being non-English speaking/uses sign language, an  was used for this visit. Only for face-to-face interpretation by an external agency, date and length of interpretation can be found on the scanned worksheet.     name: Lindy Oviedo  Agency: [employer]  Language: Danna   Telephone number: [phone]  Type of interpretation: Face-to-face, spoken

## 2022-03-29 NOTE — PROGRESS NOTES
Nursing Notes:   Iris Looney CMA  3/29/2022 11:02 AM  Signed  Due to patient being non-English speaking/uses sign language, an  was used for this visit. Only for face-to-face interpretation by an external agency, date and length of interpretation can be found on the scanned worksheet.     name: Lindy Oviedo  Agency: [employer]  Language: Danna   Telephone number: [phone]  Type of interpretation: Face-to-face, spoken        Iris Looney CMA  3/29/2022 11:02 AM  Signed  Critical Vital Report    Did patient have a critical vital during today's visit? Yes  Which vital is reporting as critical?: Blood Pressure    I personally notified the following: Provider    Action(s) taken: I left room and notified provider personally.    Iris Looney CMA                      ASSESSMENT AND PLAN:      Po was seen today for follow up.    Diagnoses and all orders for this visit:    Chest wall pain.  Right-sided chest wall pain.  Atypical, and pain is reproducible on palpation but also endorsed being deeper inside.  We will do a chest x-ray to rule out any internal pathology.  Will prescribe hydrocortisone for the irritation and contact dermatitis from the patch.  Will try muscle relaxant at night to help with relaxation, and restart treatment for acid reflux in case this is contributing.  If not improving at next visit, could consider doing a CT or further evaluation.  -     XR CHEST 2 VW; Future  -     hydrocortisone (CORTAID) 1 % external cream; Apply topically 2 times daily  -     cyclobenzaprine (FLEXERIL) 10 MG tablet; Take 1 tablet (10 mg) by mouth nightly as needed for muscle spasms or other (right sided chest pain)    GERD.  We will restart her famotidine to see if this improves symptoms.  -     famotidine (PEPCID) 40 MG tablet; Take 1 tablet (40 mg) by mouth daily        Patient Instructions   Use cream (hydrocortisone) on chest wall 2x per day to help with itching and  rash.     Take the acid reflux medication (Famotidine) 1 pill daily for next 2 weeks to see if that improves symptoms.     Take the muscle relaxant (Flexeril), 1 pill daily at night to help with muscle pain and sleep.     Follow up in 2-3 weeks for recheck of symptoms.        Nellie Harman MD    SUBJECTIVE  Po Klaus is a 52 year old female with past medical history significant for    Patient Active Problem List   Diagnosis     Self-limiting autoimmune thyroiditis with transient hyperthyroidism and/or hypothyroidism     Health Care Home     Lump In / On the skin     Vitamin D deficiency     Screening for depression     CTS (carpal tunnel syndrome)     Other chronic nonalcoholic liver disease     Others present at the visit:  Danna olsoner Lindy Navarrete, present in person.     Presents for   Chief Complaint   Patient presents with     Follow Up     Pt is here to follow up on her pulled muscle in her neck.      Patient presents today for follow-up on right-sided chest pain.  She continues to have difficulty with this pain in her right upper chest area.  It radiates from the front of the chest to the back, and is present both on the surface but also deep inside.  She describes it as feeling sharp, and she has constant pain up to 4 to 5 days but the pain is worse when she takes a big deep breath.  It is very irritating and she describes it as a poking sensation, and now she is having some itching and discomfort on the outside of her chest as well.  No left-sided chest pain, no shortness of breath, has had some occasional coughing, especially when she chokes on her food and this makes the pain worse.  She notices some radiation up and tightness into her neck as well as down into her right arm.    The Voltaren cream that she was prescribed at last visit helped some.  Pain still returns however.  Tylenol or ibuprofen of both helped as well, but only for short period of time.  She denies any abdominal pain but does have  some difficulty with gas.  She describes a sensation of generalized heaviness across her entire right side and does worry that she could be having a stroke or other problems.    She has taken famotidine in the past for GERD, but only takes it when she is having trouble.  Is not taking it right now.      OBJECTIVE:  Vitals: BP (!) 141/85   Pulse 73   Temp 98.3  F (36.8  C) (Oral)   Resp 16   Wt 68.3 kg (150 lb 9.6 oz)   LMP 01/18/2015 (Approximate)   SpO2 99%   BMI 26.68 kg/m    BMI= Body mass index is 26.68 kg/m .  Objective:    Vitals:  Vitals are reviewed and are within the normal range  Gen:  Alert, pleasant, no acute distress  Cardiac:  Regular rate and rhythm, no murmurs, rubs or gallops  Respiratory:  Lungs clear to auscultation bilaterally  Neck: She has some pain in the paraspinous muscles and strap muscles along the right side of the neck extending down toward the shoulder.  No vertebral body tenderness, and normal range of motion of the neck.  No pain with axial loading.  Some pain with full range of motion especially stretching the neck up towards the left side.  Mild tenderness over the clavicle.  Chest: She has a erythematous irritating appearing rash in the shape of the school year where she has tried using patches she bought at the store.  Has pain outside of that as well.  No lateral pain with tenderness.  Some pain over the scapula and along the back.  No guarding.  No lymphadenopathy.  Extremities: Normal range of motion in the arm and shoulder.  Good strength.  Reflexes are symmetric.    Chest x-ray reviewed by myself and the patient together during the visit.  No abnormalities or infiltrates per my review.        Patient Instructions   Use cream (hydrocortisone) on chest wall 2x per day to help with itching and rash.     Take the acid reflux medication (Famotidine) 1 pill daily for next 2 weeks to see if that improves symptoms.     Take the muscle relaxant (Flexeril), 1 pill daily at night  to help with muscle pain and sleep.     Follow up in 2-3 weeks for recheck of symptoms.        Nellie Harman MD

## 2022-03-29 NOTE — PATIENT INSTRUCTIONS
Use cream (hydrocortisone) on chest wall 2x per day to help with itching and rash.     Take the acid reflux medication (Famotidine) 1 pill daily for next 2 weeks to see if that improves symptoms.     Take the muscle relaxant (Flexeril), 1 pill daily at night to help with muscle pain and sleep.     Follow up in 2-3 weeks for recheck of symptoms.

## 2022-04-26 ENCOUNTER — OFFICE VISIT (OUTPATIENT)
Dept: FAMILY MEDICINE | Facility: CLINIC | Age: 52
End: 2022-04-26
Payer: COMMERCIAL

## 2022-04-26 VITALS
WEIGHT: 154.2 LBS | SYSTOLIC BLOOD PRESSURE: 134 MMHG | RESPIRATION RATE: 16 BRPM | OXYGEN SATURATION: 98 % | BODY MASS INDEX: 27.32 KG/M2 | HEART RATE: 90 BPM | DIASTOLIC BLOOD PRESSURE: 82 MMHG | TEMPERATURE: 98.8 F

## 2022-04-26 DIAGNOSIS — R07.89 CHEST WALL PAIN: ICD-10-CM

## 2022-04-26 DIAGNOSIS — R52 PAIN: ICD-10-CM

## 2022-04-26 DIAGNOSIS — R14.0 BLOATING: ICD-10-CM

## 2022-04-26 DIAGNOSIS — L23.9 ALLERGIC CONTACT DERMATITIS, UNSPECIFIED TRIGGER: Primary | ICD-10-CM

## 2022-04-26 PROCEDURE — 99213 OFFICE O/P EST LOW 20 MIN: CPT | Performed by: STUDENT IN AN ORGANIZED HEALTH CARE EDUCATION/TRAINING PROGRAM

## 2022-04-26 RX ORDER — TRIAMCINOLONE ACETONIDE 1 MG/G
CREAM TOPICAL 2 TIMES DAILY
Qty: 30 G | Refills: 0 | Status: SHIPPED | OUTPATIENT
Start: 2022-04-26 | End: 2023-02-03

## 2022-04-26 RX ORDER — SIMETHICONE 125 MG
1 CAPSULE ORAL 3 TIMES DAILY PRN
Qty: 30 CAPSULE | Refills: 1 | Status: SHIPPED | OUTPATIENT
Start: 2022-04-26 | End: 2023-02-03

## 2022-04-26 RX ORDER — CYCLOBENZAPRINE HCL 5 MG
5 TABLET ORAL
Qty: 15 TABLET | Refills: 0 | Status: SHIPPED | OUTPATIENT
Start: 2022-04-26 | End: 2023-02-03

## 2022-04-26 RX ORDER — ACETAMINOPHEN 500 MG
1000 TABLET ORAL EVERY 6 HOURS PRN
Qty: 60 TABLET | Refills: 1 | Status: SHIPPED | OUTPATIENT
Start: 2022-04-26 | End: 2023-04-19

## 2022-04-26 NOTE — NURSING NOTE
Due to patient being non-English speaking/uses sign language, an  was used for this visit. Only for face-to-face interpretation by an external agency, date and length of interpretation can be found on the scanned worksheet.     name: Lindy Oviedo  Agency: [employer]  Language: Danna   Telephone number: [phone]  Type of interpretation: Face-to-face, spoken

## 2022-04-26 NOTE — PATIENT INSTRUCTIONS
Cream (Triamcinolone) to area on back 2-3 times per day.  Have your family help with this to make sure it gets in the right place.    NO other creams or substances on the area for the next 1 week, including heat, in order to help this heal.     Refilled the gas pain medication.   Refilled tylenol.   Gave you a lower dose muscle relaxant (flexeril), so if you need to use it, hopefully it will not make you sleepy.

## 2022-04-26 NOTE — PROGRESS NOTES
[facility]    Assessment/Plan:    Bloating  Refilled medication.  - Simethicone 125 MG CAPS  Dispense: 30 capsule; Refill: 1    Chest wall pain  Pain is improved on the front side with flexeril dose, but has increased drowsiness in the morning. Will decrease dosage and assess if this helps with drowsiness.  - cyclobenzaprine (FLEXERIL) 5 MG tablet  Dispense: 15 tablet; Refill: 0    Pain  Refilled medication.  - acetaminophen (ACETAMINOPHEN EXTRA STRENGTH) 500 MG tablet  Dispense: 60 tablet; Refill: 1    Allergic contact dermatitis, unspecified trigger  Back itching and pain is likely due to contact dermatitis. There is a large erythematous patch with excoriations consistent with contact derm. Lower concern for shingles or fungal infection given presentation. Will trial triamcinolone TID for a week and hold her current heat/cream/tyler treatments.   - triamcinolone (KENALOG) 0.1 % external cream  Dispense: 30 g; Refill: 0    Yinkatiana Kendall, MS3    Subjective  Po Klaus is a 52 year old female with a PMH of right-sided chest wall pain is presenting to clinic for follow-up of this pain.     She was last seen in clinic on 03/29/22 for right-sided chest wall pain. At that time obtained a CXR, which was unremarkable. She was given flexeril to help with pain management. Flexeril has improved the chest pain, but now her right shoulder and back has pain of a throbbing quality and is itchy. She clarifies that itchy is a pins and needles sensation. Sometimes the pain goes down to her legs and spreads other places, but is mostly localized in her back. She does not identify causes that makes the pain worse. Pain is not present in the morning, but pins and needles/burning sensation is present in the morning. She's been applying heat and warmed tyler as well as hydrocortisone cream to the area and using her fingers and a comb to scratch the area.     Current Outpatient Medications   Medication Sig Dispense  Refill     acetaminophen (ACETAMINOPHEN EXTRA STRENGTH) 500 MG tablet Take 2 tablets (1,000 mg) by mouth every 6 hours as needed for mild pain 60 tablet 1     cyclobenzaprine (FLEXERIL) 5 MG tablet Take 1 tablet (5 mg) by mouth nightly as needed for muscle spasms or other (right sided chest pain) 15 tablet 0     Simethicone 125 MG CAPS Take 1 dose. by mouth 3 times daily as needed (gas pain) 30 capsule 1     triamcinolone (KENALOG) 0.1 % external cream Apply topically 2 times daily 30 g 0     clotrimazole (LOTRIMIN) 1 % external cream Apply topically 2 times daily (Patient not taking: Reported on 2/23/2022) 15 g 0     diclofenac (VOLTAREN) 1 % topical gel Apply 4 g topically 4 times daily 350 g 3     famotidine (PEPCID) 40 MG tablet TAKE 1 TABLET(40 MG) BY MOUTH DAILY 30 tablet 3     gabapentin (NEURONTIN) 100 MG capsule Take 1 capsule (100 mg) by mouth 3 times daily as needed (pain) (Patient not taking: Reported on 2/23/2022) 90 capsule 1     hydrocortisone (CORTAID) 1 % external cream Apply topically 2 times daily 15 g 0     meclizine (ANTIVERT) 25 MG tablet Take 1 tablet (25 mg) by mouth 3 times daily as needed for dizziness 15 tablet 0     ondansetron (ZOFRAN-ODT) 4 MG ODT tab Take 1 tablet (4 mg) by mouth every 8 hours as needed for nausea (Patient not taking: Reported on 2/23/2022) 10 tablet 0     senna (SENOKOT) 8.6 MG tablet Take 1 tablet by mouth 2 times daily as needed for constipation (Patient not taking: Reported on 2/23/2022) 60 tablet 3     Vitamin D3 (CHOLECALCIFEROL) 25 mcg (1000 units) tablet Take 2 tablets (50 mcg) by mouth daily 30 tablet 11       O: /82 (BP Location: Left arm, Patient Position: Sitting, Cuff Size: Adult Regular)   Pulse 90   Temp 98.8  F (37.1  C) (Oral)   Resp 16   Wt 69.9 kg (154 lb 3.2 oz)   LMP 01/18/2015 (Approximate)   SpO2 98%   BMI 27.32 kg/m     Gen:  Well nourished and in no acute distress   CV:  RRR  - no murmurs noted   Pulm:  CTAB, no wheezes or crackles  noted, good air entry   ABD: soft, nontender, no rebound  Extrem: 5/5 shoulder abduction/adduction, internal and external rotation bilaterally.   Skin: 4in x 4in erythematous plaque with some overlaying scaling. Exorciations present. No vesicles/pustules visualized.  Psych: Euthymic

## 2022-04-26 NOTE — PROGRESS NOTES
Preceptor Attestation:   I was present with the medical student who participated in the service and in the documentation of this note. I have verified the history and personally performed the physical exam and medical decision making. I have verified the content of the note, which accurately reflects my assessment of the patient and the plan of care.   Supervising Physician:  Nellie Harman MD.

## 2022-06-06 ENCOUNTER — OFFICE VISIT (OUTPATIENT)
Dept: NEUROLOGY | Facility: CLINIC | Age: 52
End: 2022-06-06
Attending: STUDENT IN AN ORGANIZED HEALTH CARE EDUCATION/TRAINING PROGRAM
Payer: COMMERCIAL

## 2022-06-06 VITALS — SYSTOLIC BLOOD PRESSURE: 132 MMHG | HEART RATE: 84 BPM | DIASTOLIC BLOOD PRESSURE: 86 MMHG

## 2022-06-06 DIAGNOSIS — G56.01 CARPAL TUNNEL SYNDROME OF RIGHT WRIST: Primary | ICD-10-CM

## 2022-06-06 PROCEDURE — 99203 OFFICE O/P NEW LOW 30 MIN: CPT | Performed by: PSYCHIATRY & NEUROLOGY

## 2022-06-06 NOTE — LETTER
"    6/6/2022         RE: Saman Enrique  [address] [address]        Dear Colleague,    Thank you for referring your patient, Saman Enrique, to the [facility]. Please see a copy of my visit note below.    [facility] Neurology Consultation    Saman Enrique MRN# [id number]   Age: 52 year old YOB: 1970     Requesting physician: Nellie Quinones     Reason for Consultation: right sided paresthesias      History of Presenting Symptoms:   Saman Enrique is a 52 year old female who presents today for evaluation of right sided paresthesias.  She has a pertinent medical history of BRANDON, GERD, and chronic neck and back pain.    On 9/27/2021 she presented to the ED for acute onset dizziness 2 hours after inhaling some clorox .  The dizziness was constant and described as light-headed/room-spinning, improved with sitting down.  In the ED, her BP was 149/69 but she had no focal deficits. At follow up with her PCP 9/29/2021, she reported feeling off balance, and a recurrence of symptoms that day where she felt that her head was \"heavy\" and that turning her head made her symptoms worse.  At follow up with her PCP, 2/23/2022, she reported new symptoms of right arm and leg tingling along with a sore throat, but it was unclear if the symptoms occurred at the same time (2 weeks prior).  At follow up 4/26/2022, she noted having right sided chest wall/shoulder pain, and that flexeril was helping her with her pain.    On 6/25/2018, the patient was seen with her PCP at the time for right sided body pain and burning sensations.  She reported the symptoms being present for 2 years, on-off, and that naproxen worked best for her pain.  MRI cervical spine 7/16/2018 was suggestive for mild spinal canal narrowing without signs of myelopathy at C6-7, as well as a mild neuro foraminal narrowing at the same level b/l.    Today, the patient confirms that she has right sided numbness, " but mostly points that her fingers become numb intermittently.  She points to her tips of the fingers, and thumb on the right hand, but not the palm or wrist. She notices it mostly when sleeping at night, which can wake her up.  If she moves her hand, it goes away.  It never is sharp or stabbing pain, but more-so of a coldness and numbness.  It can occur more often when writing.   The pain often subsides with a medication, but she is unsure as to what medication she uses.     Social History:   She stays at home, and does the primary cooking and cleaning while taking care of her son.     Medications:   Cyclobenzaprine  Gabapentin  Meclizine     Physical Exam:   Vitals: /86 (BP Location: Right arm, Patient Position: Sitting, Cuff Size: Adult Regular)   Pulse 84   LMP 01/18/2015 (Approximate)    General: Seated comfortably in no acute distress. Taking care of son with disability at the time of the visit.  HEENT: Neck supple with normal range of motion. Spurling's on right and left negative.   Neurologic:     Mental Status: Fully alert, attentive and oriented. Speech clear and fluent with talking to interpretor (no signs of speech pauses or slurring).     Cranial Nerves: Visual fields intact to threat. PERRL. EOMI with normal smooth pursuit.Facial movements symmetric. Hearing not formally tested but intact to conversation. Palate elevation symmetric, uvula midline. No dysarthria. Shoulder shrug strong bilaterally. Tongue protrusion midline.     Motor: No tremors or other abnormal movements observed. Normal/symmetric rapid finger tapping. Strength 5/5 throughout upper and lower extremities except for 4/5 weakness with right thumb opposition but not flexion.     Deep Tendon Reflexes: 2+/symmetric throughout upper and lower extremities.     Sensory: No loss of pinprick or light touch at any median or ulnar or cervical root distribution b/l. Negative Romberg.  Phalens + on right but absent Tinels.     Coordination:  Finger-nose-fingerintact without dysmetria. Rapid alternating movements intact/symmetric with normal speed and rhythm.     Gait: Normal, steady casual gait.         Assessment and Plan:   Assessment:  Carpal tunnel syndrome, Right    The patient was referred for both arm and leg sensory changes, but doesn't note any sensory or motor deficits on her lower extremity today.  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  I suspect she has carpal tunnel syndrome, and asked she use a wrist brace nightly for 3 months to see if her symptoms go away.  We will follow up at the time, and if she still has sensory loss, I would ask she has an EMG or be seen with a hand orthopedic surgeon for consideration of CTS release given her mild weakness noted today.     Plan:  - DME order for R-wrist brace with thumb splint     Follow up in Neurology clinic in 3 months or should new concerns arise.    NIYA Andrea D.O.   of Neurology    Total time today (41 min) in this patient encounter was spent on pre-charting, counseling and/or coordination of care. The patient is in agreement with this plan and has no further questions.        Again, thank you for allowing me to participate in the care of your patient.        Sincerely,        Nader Andrea, DO

## 2022-06-06 NOTE — PROGRESS NOTES
"[facility] Neurology Consultation    Saman Enrique MRN# [id number]   Age: 52 year old YOB: 1970     Requesting physician: Nellie Quinones     Reason for Consultation: right sided paresthesias      History of Presenting Symptoms:   Saman Enrique is a 52 year old female who presents today for evaluation of right sided paresthesias.  She has a pertinent medical history of BRANDON, GERD, and chronic neck and back pain.    On 9/27/2021 she presented to the ED for acute onset dizziness 2 hours after inhaling some clorox .  The dizziness was constant and described as light-headed/room-spinning, improved with sitting down.  In the ED, her BP was 149/69 but she had no focal deficits. At follow up with her PCP 9/29/2021, she reported feeling off balance, and a recurrence of symptoms that day where she felt that her head was \"heavy\" and that turning her head made her symptoms worse.  At follow up with her PCP, 2/23/2022, she reported new symptoms of right arm and leg tingling along with a sore throat, but it was unclear if the symptoms occurred at the same time (2 weeks prior).  At follow up 4/26/2022, she noted having right sided chest wall/shoulder pain, and that flexeril was helping her with her pain.    On 6/25/2018, the patient was seen with her PCP at the time for right sided body pain and burning sensations.  She reported the symptoms being present for 2 years, on-off, and that naproxen worked best for her pain.  MRI cervical spine 7/16/2018 was suggestive for mild spinal canal narrowing without signs of myelopathy at C6-7, as well as a mild neuro foraminal narrowing at the same level b/l.    Today, the patient confirms that she has right sided numbness, but mostly points that her fingers become numb intermittently.  She points to her tips of the fingers, and thumb on the right hand, but not the palm or wrist. She notices it mostly when sleeping at night, which can wake her up.  If she " moves her hand, it goes away.  It never is sharp or stabbing pain, but more-so of a coldness and numbness.  It can occur more often when writing.   The pain often subsides with a medication, but she is unsure as to what medication she uses.     Social History:   She stays at home, and does the primary cooking and cleaning while taking care of her son.     Medications:   Cyclobenzaprine  Gabapentin  Meclizine     Physical Exam:   Vitals: /86 (BP Location: Right arm, Patient Position: Sitting, Cuff Size: Adult Regular)   Pulse 84   LMP 01/18/2015 (Approximate)    General: Seated comfortably in no acute distress. Taking care of son with disability at the time of the visit.  HEENT: Neck supple with normal range of motion. Spurling's on right and left negative.   Neurologic:     Mental Status: Fully alert, attentive and oriented. Speech clear and fluent with talking to interpretor (no signs of speech pauses or slurring).     Cranial Nerves: Visual fields intact to threat. PERRL. EOMI with normal smooth pursuit.Facial movements symmetric. Hearing not formally tested but intact to conversation. Palate elevation symmetric, uvula midline. No dysarthria. Shoulder shrug strong bilaterally. Tongue protrusion midline.     Motor: No tremors or other abnormal movements observed. Normal/symmetric rapid finger tapping. Strength 5/5 throughout upper and lower extremities except for 4/5 weakness with right thumb opposition but not flexion.     Deep Tendon Reflexes: 2+/symmetric throughout upper and lower extremities.     Sensory: No loss of pinprick or light touch at any median or ulnar or cervical root distribution b/l. Negative Romberg.  Phalens + on right but absent Tinels.     Coordination: Finger-nose-fingerintact without dysmetria. Rapid alternating movements intact/symmetric with normal speed and rhythm.     Gait: Normal, steady casual gait.         Assessment and Plan:   Assessment:  Carpal tunnel syndrome,  Right    The patient was referred for both arm and leg sensory changes, but doesn't note any sensory or motor deficits on her lower extremity today.  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  I suspect she has carpal tunnel syndrome, and asked she use a wrist brace nightly for 3 months to see if her symptoms go away.  We will follow up at the time, and if she still has sensory loss, I would ask she has an EMG or be seen with a hand orthopedic surgeon for consideration of CTS release given her mild weakness noted today.     Plan:  - DME order for R-wrist brace with thumb splint     Follow up in Neurology clinic in 3 months or should new concerns arise.    NIYA Andrea D.O.   of Neurology    Total time today (41 min) in this patient encounter was spent on pre-charting, counseling and/or coordination of care. The patient is in agreement with this plan and has no further questions.

## 2023-02-03 ENCOUNTER — OFFICE VISIT (OUTPATIENT)
Dept: FAMILY MEDICINE | Facility: CLINIC | Age: 53
End: 2023-02-03
Payer: COMMERCIAL

## 2023-02-03 VITALS
DIASTOLIC BLOOD PRESSURE: 79 MMHG | SYSTOLIC BLOOD PRESSURE: 120 MMHG | HEART RATE: 80 BPM | OXYGEN SATURATION: 98 % | WEIGHT: 148 LBS | TEMPERATURE: 98.3 F | RESPIRATION RATE: 18 BRPM | HEIGHT: 63 IN | BODY MASS INDEX: 26.22 KG/M2

## 2023-02-03 DIAGNOSIS — R09.81 NASAL CONGESTION: ICD-10-CM

## 2023-02-03 DIAGNOSIS — Z12.31 VISIT FOR SCREENING MAMMOGRAM: ICD-10-CM

## 2023-02-03 DIAGNOSIS — R14.0 BLOATING: ICD-10-CM

## 2023-02-03 DIAGNOSIS — Z00.00 ROUTINE GENERAL MEDICAL EXAMINATION AT A HEALTH CARE FACILITY: ICD-10-CM

## 2023-02-03 DIAGNOSIS — R10.11 RUQ ABDOMINAL PAIN: ICD-10-CM

## 2023-02-03 DIAGNOSIS — L23.9 ALLERGIC CONTACT DERMATITIS, UNSPECIFIED TRIGGER: ICD-10-CM

## 2023-02-03 DIAGNOSIS — H81.12 BENIGN PAROXYSMAL POSITIONAL VERTIGO OF LEFT EAR: ICD-10-CM

## 2023-02-03 DIAGNOSIS — R10.13 ABDOMINAL PAIN, EPIGASTRIC: Primary | ICD-10-CM

## 2023-02-03 DIAGNOSIS — H04.123 DRY EYES: ICD-10-CM

## 2023-02-03 LAB
ALBUMIN SERPL BCG-MCNC: 4.2 G/DL (ref 3.5–5.2)
ALP SERPL-CCNC: 114 U/L (ref 35–104)
ALT SERPL W P-5'-P-CCNC: 32 U/L (ref 10–35)
ANION GAP SERPL CALCULATED.3IONS-SCNC: 11 MMOL/L (ref 7–15)
AST SERPL W P-5'-P-CCNC: 76 U/L (ref 10–35)
BILIRUB SERPL-MCNC: 0.3 MG/DL
BUN SERPL-MCNC: 10.7 MG/DL (ref 6–20)
CALCIUM SERPL-MCNC: 9.8 MG/DL (ref 8.6–10)
CHLORIDE SERPL-SCNC: 107 MMOL/L (ref 98–107)
CHOLEST SERPL-MCNC: 161 MG/DL
CREAT SERPL-MCNC: 0.77 MG/DL (ref 0.51–0.95)
DEPRECATED HCO3 PLAS-SCNC: 25 MMOL/L (ref 22–29)
GFR SERPL CREATININE-BSD FRML MDRD: >90 ML/MIN/1.73M2
GLUCOSE SERPL-MCNC: 96 MG/DL (ref 70–99)
HDLC SERPL-MCNC: 54 MG/DL
LDLC SERPL CALC-MCNC: 89 MG/DL
NONHDLC SERPL-MCNC: 107 MG/DL
POTASSIUM SERPL-SCNC: 4.2 MMOL/L (ref 3.4–5.3)
PROT SERPL-MCNC: 7.7 G/DL (ref 6.4–8.3)
SODIUM SERPL-SCNC: 143 MMOL/L (ref 136–145)
TRIGL SERPL-MCNC: 89 MG/DL
TSH SERPL DL<=0.005 MIU/L-ACNC: 0.77 UIU/ML (ref 0.3–4.2)

## 2023-02-03 PROCEDURE — 84443 ASSAY THYROID STIM HORMONE: CPT | Performed by: STUDENT IN AN ORGANIZED HEALTH CARE EDUCATION/TRAINING PROGRAM

## 2023-02-03 PROCEDURE — 99214 OFFICE O/P EST MOD 30 MIN: CPT | Mod: 25 | Performed by: STUDENT IN AN ORGANIZED HEALTH CARE EDUCATION/TRAINING PROGRAM

## 2023-02-03 PROCEDURE — 36415 COLL VENOUS BLD VENIPUNCTURE: CPT | Performed by: STUDENT IN AN ORGANIZED HEALTH CARE EDUCATION/TRAINING PROGRAM

## 2023-02-03 PROCEDURE — 80061 LIPID PANEL: CPT | Performed by: STUDENT IN AN ORGANIZED HEALTH CARE EDUCATION/TRAINING PROGRAM

## 2023-02-03 PROCEDURE — 80053 COMPREHEN METABOLIC PANEL: CPT | Performed by: STUDENT IN AN ORGANIZED HEALTH CARE EDUCATION/TRAINING PROGRAM

## 2023-02-03 PROCEDURE — 99396 PREV VISIT EST AGE 40-64: CPT | Performed by: STUDENT IN AN ORGANIZED HEALTH CARE EDUCATION/TRAINING PROGRAM

## 2023-02-03 PROCEDURE — 86803 HEPATITIS C AB TEST: CPT | Performed by: STUDENT IN AN ORGANIZED HEALTH CARE EDUCATION/TRAINING PROGRAM

## 2023-02-03 RX ORDER — SIMETHICONE 125 MG
1 CAPSULE ORAL 3 TIMES DAILY PRN
Qty: 30 CAPSULE | Refills: 1 | Status: SHIPPED | OUTPATIENT
Start: 2023-02-03 | End: 2023-03-04

## 2023-02-03 RX ORDER — TRIAMCINOLONE ACETONIDE 1 MG/G
CREAM TOPICAL 2 TIMES DAILY
Qty: 30 G | Refills: 0 | Status: SHIPPED | OUTPATIENT
Start: 2023-02-03 | End: 2023-02-15

## 2023-02-03 RX ORDER — CETIRIZINE HYDROCHLORIDE 10 MG/1
10 TABLET ORAL DAILY
Qty: 30 TABLET | Refills: 1 | Status: SHIPPED | OUTPATIENT
Start: 2023-02-03 | End: 2023-04-19

## 2023-02-03 RX ORDER — FLUTICASONE PROPIONATE 50 MCG
1 SPRAY, SUSPENSION (ML) NASAL DAILY
Qty: 15.8 ML | Refills: 1 | Status: SHIPPED | OUTPATIENT
Start: 2023-02-03 | End: 2023-04-19

## 2023-02-03 RX ORDER — FAMOTIDINE 40 MG/1
40 TABLET, FILM COATED ORAL DAILY
Qty: 30 TABLET | Refills: 1 | Status: SHIPPED | OUTPATIENT
Start: 2023-02-03 | End: 2023-02-15

## 2023-02-03 ASSESSMENT — ENCOUNTER SYMPTOMS
PALPITATIONS: 0
EYE PAIN: 0
CONSTIPATION: 0
HEMATURIA: 0
SORE THROAT: 0
CHILLS: 0
HEMATOCHEZIA: 0
WEAKNESS: 0
HEADACHES: 0
DIARRHEA: 0
FREQUENCY: 0
ABDOMINAL PAIN: 0
DIZZINESS: 0
FEVER: 0
SHORTNESS OF BREATH: 0
PARESTHESIAS: 0
JOINT SWELLING: 0
NERVOUS/ANXIOUS: 0
MYALGIAS: 0
HEARTBURN: 0
DYSURIA: 0
ARTHRALGIAS: 1
NAUSEA: 0
COUGH: 0

## 2023-02-03 NOTE — PROGRESS NOTES
SUBJECTIVE:   CC: Po is an 53 year old who presents for preventive health visit.     Patient has been advised of split billing requirements and indicates understanding: Yes     Healthy Habits:     Getting at least 3 servings of Calcium per day:  Yes    Bi-annual eye exam:  Yes    Dental care twice a year:  NO    Sleep apnea or symptoms of sleep apnea:  None    Diet:  Regular (no restrictions)    Frequency of exercise:  2-3 days/week    Duration of exercise:  15-30 minutes    Taking medications regularly:  Not Applicable    Medication side effects:  Not applicable    PHQ-2 Total Score: 0    Additional concerns today:  No  Pain  Associated symptoms include arthralgias. Pertinent negatives include no abdominal pain, chest pain, chills, congestion, coughing, fever, headaches, joint swelling, myalgias, nausea, rash, sore throat or weakness.     Patient would also like to talk about two acute on chronic problems.    The first is right-sided upper abdomen, lower chest, and mid back pain.  This has been been problematic intermittently for many years.  Currently it is most severe in the upper back.  She describes it is itchy and burning and uncomfortable.  The pain is deep inside, and she feels like it is coming from a nerve or muscle.  She uses Tylenol, heat and has used pain patches on this before and its been helpful.  It will help for a little while and then the pain comes back.  She does not think it is connected with her abdominal symptoms, which she describes as having some gas pain and fullness after eating.    Secondly she would like to talk about a bad smell in her mouth.  Has noticed dry throat, bad smell, and some mild difficulty with swallowing.  No food getting stuck in her mouth, no burning sensation or acid reflux.  She has noticed some drainage from her nose.  She has some pain and discomfort that starts in the mid abdomen and goes up into the chest at times.  It is not affected by eating.  She is worried  that there is an infection there.    She also has a history of poor dentition.  She was told many years ago she had cavities and that they wanted to pull her teeth, so she never went back to the dentist.    She is out of all of her medications and has not been taking any of them lately    Today's PHQ-2 Score:   PHQ-2 ( 1999 [employer]) 2/3/2023   Q1: Little interest or pleasure in doing things 0   Q2: Feeling down, depressed or hopeless 0   PHQ-2 Score 0   PHQ-2 Total Score (12-17 Years)- Positive if 3 or more points; Administer PHQ-A if positive -   Q1: Little interest or pleasure in doing things Not at all   Q2: Feeling down, depressed or hopeless Not at all   PHQ-2 Score 0       Have you ever done Advance Care Planning? (For example, a Health Directive, POLST, or a discussion with a medical provider or your loved ones about your wishes): No, advance care planning information given to patient to review.  Patient plans to discuss their wishes with loved ones or provider.      Social History     Tobacco Use     Smoking status: Never     Smokeless tobacco: Never   Substance Use Topics     Alcohol use: No       Alcohol Use 2/3/2023   Prescreen: >3 drinks/day or >7 drinks/week? Not Applicable       Reviewed orders with patient.  Reviewed health maintenance and updated orders accordingly - Yes    Breast Cancer Screening:  Any new diagnosis of family breast, ovarian, or bowel cancer? No   Last mammogram was in 2018 and was normal.  She would like to schedule this.  Pertinent mammograms are reviewed under the imaging tab.    Colon cancer screening:  No new or previous diagnosis of colon cancer.  She is interested in completing FIT testing.    Cervical cancer screening:  Most recent Pap smear was done in January 2021 and was normal with negative HPV.  She is next due in January 2026.  History of abnormal Pap smear: NO - age 30-65 PAP every 5 years with negative HPV co-testing recommended  PAP / HPV Latest Ref Rng & Units  "2021   HPV16 NEG Negative   HPV18 NEG Negative   HRHPV NEG Negative     Reviewed and updated as needed this visit by clinical staff    Allergies  Meds              Reviewed and updated as needed this visit by Provider                 Past Medical History:   Diagnosis Date     CVA (cerebral infarction)      Hepatitis B immune      History of blood transfusion      History of chicken pox       Past Surgical History:   Procedure Laterality Date     CYST REMOVAL       OB History    Para Term  AB Living   4 4 4 0 0 0   SAB IAB Ectopic Multiple Live Births   0 0 0 0 0      # Outcome Date GA Lbr Eb/2nd Weight Sex Delivery Anes PTL Lv   4 Term            3 Term            2 Term            1 Term                Review of Systems   Constitutional: Negative for chills and fever.   HENT: Negative for congestion, ear pain, hearing loss and sore throat.    Eyes: Negative for pain and visual disturbance.   Respiratory: Negative for cough and shortness of breath.    Cardiovascular: Negative for chest pain, palpitations and peripheral edema.   Gastrointestinal: Negative for abdominal pain, constipation, diarrhea, heartburn, hematochezia and nausea.   Genitourinary: Negative for dysuria, frequency, genital sores, hematuria and urgency.   Musculoskeletal: Positive for arthralgias. Negative for joint swelling and myalgias.   Skin: Negative for rash.   Neurological: Negative for dizziness, weakness, headaches and paresthesias.   Psychiatric/Behavioral: Negative for mood changes. The patient is not nervous/anxious.         OBJECTIVE:   /79   Pulse 80   Temp 98.3  F (36.8  C)   Resp 18   Ht 1.6 m (5' 3\")   Wt 67.1 kg (148 lb)   LMP 2015 (Approximate)   SpO2 98%   BMI 26.22 kg/m    Physical Exam  Constitutional:       Appearance: Normal appearance. She is normal weight.   HENT:      Head: Normocephalic.      Right Ear: Tympanic membrane and ear canal normal.      Left Ear: Tympanic membrane and " ear canal normal.      Nose: Congestion and rhinorrhea present.      Mouth/Throat:      Mouth: Mucous membranes are dry.      Pharynx: No oropharyngeal exudate or posterior oropharyngeal erythema.      Comments: Significant tooth decay and breakdown.  No acute erythema in the gums.  Eyes:      Extraocular Movements: Extraocular movements intact.      Conjunctiva/sclera: Conjunctivae normal.      Pupils: Pupils are equal, round, and reactive to light.   Neck:      Comments: Mild thyromegaly without nodules on exam  Cardiovascular:      Rate and Rhythm: Normal rate and regular rhythm.      Pulses: Normal pulses.      Heart sounds: Normal heart sounds.   Pulmonary:      Effort: Pulmonary effort is normal. No respiratory distress.      Breath sounds: Normal breath sounds. No wheezing.   Abdominal:      General: Abdomen is flat. Bowel sounds are normal.      Palpations: Abdomen is soft.   Musculoskeletal:         General: No swelling or tenderness.      Cervical back: No rigidity or tenderness.      Right lower leg: No edema.      Left lower leg: No edema.   Lymphadenopathy:      Cervical: No cervical adenopathy.   Skin:     General: Skin is warm and dry.      Comments: Thick plaque-like elevated rash on right upper back.  Some gated papules present as well.  Looks like a contact dermatitis, potentially overlying a previous previous herpes zoster infection.   Neurological:      General: No focal deficit present.      Mental Status: She is alert and oriented to person, place, and time.   Psychiatric:         Mood and Affect: Mood normal.         Behavior: Behavior normal.         ASSESSMENT/PLAN:   Po was seen today for physical and pain.  Multiple issues were discussed today.    Diagnoses and all orders for this visit:    Routine general medical examination at a health care facility  Visit for screening mammogram  Regular preventative recommendations completed today.  We will do lipid panel, mammogram, FIT testing,  hepatitis C.  -     INFLUENZA QUAD, RECOMBINANT, P-FREE (RIV4) (FLUBLOK)  -     Fecal colorectal cancer screen FIT; Future  -     MA SCREENING DIGITAL BILAT; Future  -     TSH with free T4 reflex; Future  -     Lipid Profile; Future  -     Hepatitis C antibody; Future  -     Hepatitis C antibody  -     Lipid Profile  -     TSH with free T4 reflex  -     Fecal colorectal cancer screen FIT    Abdominal pain, epigastric  Bloating  Right back contact dermatitis  Potential acid reflux  RUQ abdominal pain  Has chronic right-sided upper abdominal lower chest and upper back pain.  Evidence of a contact dermatitis present but also pain deep inside.  I do think there could be a component of acid reflux.  Will retreat with famotidine, check for H. pylori, screen for colon cancer, and check labs.  If symptoms are not improving we could consider further GI evaluation.  In the meantime we will treat the contact dermatitis and refilled her simethicone for gas.  -     Comprehensive metabolic panel; Future  -     Helicobacter pylori Antigen Stool; Future  -     Helicobacter pylori Antigen Stool  -    TSH  -     Simethicone 125 MG CAPS; Take 1 dose. by mouth 3 times daily as needed (gas pain)  -     famotidine (PEPCID) 40 MG tablet; Take 1 tablet (40 mg) by mouth daily    Allergic contact dermatitis, unspecified trigger  Significant contact dermatitis on upper back.  Will do cetirizine and triamcinolone topical cream  -     cetirizine (ZYRTEC) 10 MG tablet; Take 1 tablet (10 mg) by mouth daily  -     triamcinolone (KENALOG) 0.1 % external cream; Apply topically 2 times daily    Dry eyes  Nasal congestion  Seasonal allergies  Allergy symptoms and postnasal drip may be contributing to her bad smell in her mouth.  We will treat with cetirizine and Flonase.  -     cetirizine (ZYRTEC) 10 MG tablet; Take 1 tablet (10 mg) by mouth daily  -     dextran 70-hypromellose (TEARS NATURALE FREE PF) 0.1-0.3 % ophthalmic solution; Place 1 drop into  "both eyes daily as needed (dry , itchy eyes)    Recommended follow-up in 3 to 4 weeks to discuss test results and recheck symptoms.    Patient has been advised of split billing requirements and indicates understanding: Yes      COUNSELING:  Reviewed preventive health counseling, as reflected in patient instructions       Regular exercise       Healthy diet/nutrition       Vision screening       Hearing screening       Colorectal Cancer Screening       Consider Hep C screening for all patients one time for ages 18-79 years      BMI:   Estimated body mass index is 26.22 kg/m  as calculated from the following:    Height as of this encounter: 1.6 m (5' 3\").    Weight as of this encounter: 67.1 kg (148 lb).   Weight management plan: Discussed healthy diet and exercise guidelines    She reports that she has never smoked. She has never used smokeless tobacco.    Nellie Harman MD  [facility]  "

## 2023-02-03 NOTE — LETTER
February 6, 2023      Saman Enrique  [address] [place] [address]        Dear ,    We are writing to inform you of your test results.    Here is a copy of your lab results.  Overall, they look good.  Your thyroid testing is normal.  Your cholesterol levels are excellent.  Your liver tests however are a little high.  We'll talk about this at follow up and make sure we have a plan.  Please call the clinic at [phone] if you have any questions.       Resulted Orders   Hepatitis C antibody   Result Value Ref Range    Hepatitis C Antibody Nonreactive Nonreactive    Narrative    Assay performance characteristics have not been established for newborns, infants, and children.   Lipid Profile   Result Value Ref Range    Cholesterol 161 <200 mg/dL    Triglycerides 89 <150 mg/dL    Direct Measure HDL 54 >=50 mg/dL    LDL Cholesterol Calculated 89 <=100 mg/dL    Non HDL Cholesterol 107 <130 mg/dL    Narrative    Cholesterol  Desirable:  <200 mg/dL    Triglycerides  Normal:  Less than 150 mg/dL  Borderline High:  150-199 mg/dL  High:  200-499 mg/dL  Very High:  Greater than or equal to 500 mg/dL    Direct Measure HDL  Female:  Greater than or equal to 50 mg/dL   Male:  Greater than or equal to 40 mg/dL    LDL Cholesterol  Desirable:  <100mg/dL  Above Desirable:  100-129 mg/dL   Borderline High:  130-159 mg/dL   High:  160-189 mg/dL   Very High:  >= 190 mg/dL    Non HDL Cholesterol  Desirable:  130 mg/dL  Above Desirable:  130-159 mg/dL  Borderline High:  160-189 mg/dL  High:  190-219 mg/dL  Very High:  Greater than or equal to 220 mg/dL   TSH with free T4 reflex   Result Value Ref Range    TSH 0.77 0.30 - 4.20 uIU/mL   Comprehensive metabolic panel   Result Value Ref Range    Sodium 143 136 - 145 mmol/L    Potassium 4.2 3.4 - 5.3 mmol/L    Chloride 107 98 - 107 mmol/L    Carbon Dioxide (CO2) 25 22 - 29 mmol/L    Anion Gap 11 7 - 15 mmol/L    Urea Nitrogen 10.7 6.0 - 20.0 mg/dL    Creatinine 0.77 0.51 - 0.95  mg/dL    Calcium 9.8 8.6 - 10.0 mg/dL    Glucose 96 70 - 99 mg/dL    Alkaline Phosphatase 114 (H) 35 - 104 U/L    AST 76 (H) 10 - 35 U/L    ALT 32 10 - 35 U/L    Protein Total 7.7 6.4 - 8.3 g/dL    Albumin 4.2 3.5 - 5.2 g/dL    Bilirubin Total 0.3 <=1.2 mg/dL    GFR Estimate >90 >60 mL/min/1.73m2      Comment:      eGFR calculated using 2021 CKD-EPI equation.       If you have any questions or concerns, please call the clinic at the number listed above.       Sincerely,      Nellie Harman MD

## 2023-02-03 NOTE — PATIENT INSTRUCTIONS
Schedule appointment with the dentist:    New medications for throat:    1)  Acid pill:  Famotidine daily  2)  Nasal spray:  Fluticasone daily in both nostrils.   3)  Refill gas pain medication:  Simethicone.     For back:  --Use itching pill and cream 2x per day everyday.   --Come back in 3-4 weeks to talk about inside pain and make sure outside is better.        Preventive Health Recommendations  Female Ages 50 - 64    Yearly exam: See your health care provider every year in order to  Review health changes.   Discuss preventive care.    Review your medicines if your doctor has prescribed any.    Get a Pap test every three years (unless you have an abnormal result and your provider advises testing more often).  If you get Pap tests with HPV test, you only need to test every 5 years, unless you have an abnormal result.   You do not need a Pap test if your uterus was removed (hysterectomy) and you have not had cancer.  You should be tested each year for STDs (sexually transmitted diseases) if you're at risk.   Have a mammogram every 1 to 2 years.  Have a colonoscopy at age 50, or have a yearly FIT test (stool test). These exams screen for colon cancer.    Have a cholesterol test every 5 years, or more often if advised.  Have a diabetes test (fasting glucose) every three years. If you are at risk for diabetes, you should have this test more often.   If you are at risk for osteoporosis (brittle bone disease), think about having a bone density scan (DEXA).    Shots: Get a flu shot each year. Get a tetanus shot every 10 years.    Nutrition:   Eat at least 5 servings of fruits and vegetables each day.  Eat whole-grain bread, whole-wheat pasta and brown rice instead of white grains and rice.  Get adequate Calcium and Vitamin D.     Lifestyle  Exercise at least 150 minutes a week (30 minutes a day, 5 days a week). This will help you control your weight and prevent disease.  Limit alcohol to one drink per day.  No smoking.    Wear sunscreen to prevent skin cancer.   See your dentist every six months for an exam and cleaning.  See your eye doctor every 1 to 2 years.

## 2023-02-03 NOTE — NURSING NOTE
Due to patient being non-English speaking/uses sign language, an  was used for this visit. Only for face-to-face interpretation by an external agency, date and length of interpretation can be found on the scanned worksheet.     name: Colton Boyd  Agency: [employer]  Language: Danna   Telephone number: [phone]  Type of interpretation: Face-to-face, spoken

## 2023-02-04 LAB — HCV AB SERPL QL IA: NONREACTIVE

## 2023-02-07 PROCEDURE — 82274 ASSAY TEST FOR BLOOD FECAL: CPT | Performed by: STUDENT IN AN ORGANIZED HEALTH CARE EDUCATION/TRAINING PROGRAM

## 2023-02-07 PROCEDURE — 87338 HPYLORI STOOL AG IA: CPT | Performed by: STUDENT IN AN ORGANIZED HEALTH CARE EDUCATION/TRAINING PROGRAM

## 2023-02-08 LAB — H PYLORI AG STL QL IA: NEGATIVE

## 2023-02-09 LAB — HEMOCCULT STL QL IA: NEGATIVE

## 2023-02-13 ENCOUNTER — TELEPHONE (OUTPATIENT)
Dept: FAMILY MEDICINE | Facility: CLINIC | Age: 53
End: 2023-02-13
Payer: COMMERCIAL

## 2023-02-13 NOTE — TELEPHONE ENCOUNTER
Pt notified of below w/  and verbalized understanding.      Nellie Harman MD   2/8/2023  2:51 PM CST Back to Top      Negative H. Pylori testing.  Will discuss with patient at follow up visit.      MD Nellie Correa MD   2/4/2023  1:12 PM CST       Po Pree-     Here is a copy of your lab results.  Overall, they look good.  Your thyroid testing is normal.  Your cholesterol levels are excellent.  Your liver tests however are a little high.  We'll talk about this at follow up and make sure we have a plan.  Please call the clinic at [phone] if you have any questions.       Nellie Harman MD     Please send results to patient.         Roselyn Gayle RN on 2/13/2023 at 3:15 PM

## 2023-02-13 NOTE — TELEPHONE ENCOUNTER
[facility] phone call message- general phone call:    Reason for call: They need a call back re she might possibly need a specialty doctor for her liver based on some results she got back.    Action desired: call back.    Return call needed: Yes    OK to leave a message on voice mail? Yes    Advised patient to response may take up to 2 business days: Yes    Primary language: Danna      needed? Yes    Call taken on February 13, 2023 at 12:56 PM by Patricio Selby

## 2023-02-15 ENCOUNTER — OFFICE VISIT (OUTPATIENT)
Dept: FAMILY MEDICINE | Facility: CLINIC | Age: 53
End: 2023-02-15
Payer: COMMERCIAL

## 2023-02-15 ENCOUNTER — TELEPHONE (OUTPATIENT)
Dept: FAMILY MEDICINE | Facility: CLINIC | Age: 53
End: 2023-02-15

## 2023-02-15 VITALS
WEIGHT: 147 LBS | HEART RATE: 97 BPM | DIASTOLIC BLOOD PRESSURE: 80 MMHG | TEMPERATURE: 98.3 F | RESPIRATION RATE: 16 BRPM | SYSTOLIC BLOOD PRESSURE: 124 MMHG | OXYGEN SATURATION: 99 % | BODY MASS INDEX: 26.04 KG/M2

## 2023-02-15 DIAGNOSIS — R68.83 CHILLS: ICD-10-CM

## 2023-02-15 DIAGNOSIS — M54.2 NECK PAIN: Primary | ICD-10-CM

## 2023-02-15 DIAGNOSIS — R10.11 RUQ ABDOMINAL PAIN: ICD-10-CM

## 2023-02-15 DIAGNOSIS — L28.0 LICHENIFICATION: ICD-10-CM

## 2023-02-15 LAB
BASOPHILS # BLD AUTO: 0.1 10E3/UL (ref 0–0.2)
BASOPHILS NFR BLD AUTO: 1 %
CRP SERPL-MCNC: 3.91 MG/L
EOSINOPHIL # BLD AUTO: 0.2 10E3/UL (ref 0–0.7)
EOSINOPHIL NFR BLD AUTO: 2 %
ERYTHROCYTE [DISTWIDTH] IN BLOOD BY AUTOMATED COUNT: 12.1 % (ref 10–15)
HCT VFR BLD AUTO: 42.4 % (ref 35–47)
HGB BLD-MCNC: 13.8 G/DL (ref 11.7–15.7)
IMM GRANULOCYTES # BLD: 0 10E3/UL
IMM GRANULOCYTES NFR BLD: 0 %
LYMPHOCYTES # BLD AUTO: 2 10E3/UL (ref 0.8–5.3)
LYMPHOCYTES NFR BLD AUTO: 23 %
MCH RBC QN AUTO: 30.1 PG (ref 26.5–33)
MCHC RBC AUTO-ENTMCNC: 32.5 G/DL (ref 31.5–36.5)
MCV RBC AUTO: 92 FL (ref 78–100)
MONOCYTES # BLD AUTO: 0.9 10E3/UL (ref 0–1.3)
MONOCYTES NFR BLD AUTO: 11 %
NEUTROPHILS # BLD AUTO: 5.7 10E3/UL (ref 1.6–8.3)
NEUTROPHILS NFR BLD AUTO: 64 %
PLATELET # BLD AUTO: 333 10E3/UL (ref 150–450)
RBC # BLD AUTO: 4.59 10E6/UL (ref 3.8–5.2)
WBC # BLD AUTO: 8.9 10E3/UL (ref 4–11)

## 2023-02-15 PROCEDURE — 85025 COMPLETE CBC W/AUTO DIFF WBC: CPT

## 2023-02-15 PROCEDURE — 99214 OFFICE O/P EST MOD 30 MIN: CPT | Mod: GC

## 2023-02-15 PROCEDURE — 36415 COLL VENOUS BLD VENIPUNCTURE: CPT

## 2023-02-15 PROCEDURE — 86140 C-REACTIVE PROTEIN: CPT

## 2023-02-15 RX ORDER — FLUOCINONIDE 0.5 MG/G
OINTMENT TOPICAL 2 TIMES DAILY
Qty: 60 G | Refills: 1 | Status: SHIPPED | OUTPATIENT
Start: 2023-02-15 | End: 2023-04-19

## 2023-02-15 RX ORDER — FAMOTIDINE 40 MG/1
40 TABLET, FILM COATED ORAL DAILY
Qty: 30 TABLET | Refills: 1 | Status: SHIPPED | OUTPATIENT
Start: 2023-02-15 | End: 2023-04-19

## 2023-02-15 NOTE — PROGRESS NOTES
Assessment & Plan     Neck pain  Presenting for right sided abdominal pain and right sided neck/shoulder pain. Given history and exam, believe these are two separate problems. Neck/shoulder pain present for 1-2 years, gradually worsening. Pain is burning and aching. Positive Spurlings on the right. Shoulder ROM, strength intact. Right paracervical TTP. Qualifies for MRI given duration of symptoms and with c/o chills, night sweats will want to rule out infectious causes such as discitis. Will also get CBC and CRP to further assess for infection/inflammation. VSS in clinic, afebrile. Of note, pain also under right breast - mammogram scheduled. No skin findings and no reports of cardiac symptoms. Follow up plan pending MRI, other imaging results.  - MR Cervical Spine w/o & w Contrast  - CBC  - CRP    Lichenification  Most c/w notalgia paresthetica with pruritis over medial aspect inferior scapula in the in the setting of presumed cervical radiculopathy of the same side. Secondary lesion with lichenification due to chronic scratching. Tried triamcinolone, cetirizine for 2 weeks with minimal improvement. Will try increased topical steroid potency. If this does not work, consider dermatology referral. While awaiting derm appt, could consider trying capsaicin cream, gabapentin.   - fluocinonide (LIDEX) 0.05 % external ointment  Dispense: 60 g; Refill: 1    RUQ abdominal pain  Burning pain RUQ as well. Elevated AST 76 on CMP 2 weeks ago. H pylori, FIT neg.  Would recommend RUQ ultrasound to assess for hepatobiliary etiologies of pain. Of note, signs of hepatic steatosis on US in 2018. Given shoulder/neck symptoms, ddx includes cholelithiasis.  Reports burning sensation that goes from stomach up to her neck as well. Agree this is most likely GERD, she has not been able to try famotidine yet - will send to pharmacy again.   - US ABDOMEN LIMITED  - famotidine (PEPCID) 40 MG tablet  Dispense: 30 tablet; Refill: 1      Return  "in about 2 weeks (around 3/1/2023).    Katharine Lockett MD PGY2  [facility]  02/15/23    I precepted today with Dr. Raisa Hightower   Po is a 53 year old presenting for the following health issues:  Shoulder Pain (Also under her right breast- pain is all on her right side. This has been going on for a while now, last night she was more achy all over ) and Insomnia (She can't sleep due to the pain )      HPI      Neck/shoulder pain present for 1-2 years, gradually getting worse. Especially worse over the last 2 weeks. Pain is mostly a burning sensation. Reports soreness in her right neck. Occasionally will get tingling pains down her right arm. No numbness or weakness of the RUE. No pain with moving shoulder or arm.     Along with the burning pain is an itchy skin lesion on her upper right back. It has been there for at least months.  Itching makes it difficult for her to sleep. It has been present for a long time. She has tried triamcinolone cream twice a day that was prescribed 2 weeks ago and cetirizine which has not given much relief. Has also tried heating pad and lidocaine patch.      Burning abdominal pain for 1-2 weeks, located on the right side. Also has burning pain in the center of her stomach that \"goes up to her neck/throat\". Did not try famotidine yet.    Pain also under right breast. Feels like burning sensation. No pressure or sharp pains. Mammogram scheduled for next week. She has not felt any masses. No skin changes there. No dyspnea.       Review of Systems   Constitutional, HEENT, cardiovascular, pulmonary, gi and gu systems are negative, except as otherwise noted.      Objective    /80 (BP Location: Left arm, Patient Position: Sitting, Cuff Size: Adult Regular)   Pulse 97   Temp 98.3  F (36.8  C) (Oral)   Resp 16   Wt 66.7 kg (147 lb)   LMP 01/18/2015 (Approximate)   SpO2 99%   Breastfeeding No   BMI 26.04 kg/m    Body mass index is 26.04 kg/m .  Physical " Exam   GENERAL: healthy, alert and no distress  NECK: no adenopathy, no asymmetry, right paracervical TTP  RESP: lungs clear to auscultation - no rales, rhonchi or wheezes  CV: regular rate and rhythm, normal S1 S2, no S3 or S4, no murmur, click or rub, no peripheral edema and peripheral pulses strong  ABDOMEN: soft, RUQ TTP, Castaneda's sign neg. No hepatosplenomegaly, no masses and bowel sounds normal  MS: Left shoulder and neck - normal ROM and strength.  Right shoulder and neck - normal ROM and strength.   Spurlings test positive right side  SKIN: Approx 6 cm lesion with lichenification on medial aspect of right inferior scapula   NEURO: Normal strength and tone, CN 2-12 intact, mentation intact and speech normal  PSYCH: mentation appears normal, affect normal

## 2023-02-15 NOTE — LETTER
February 22, 2023      Saman Enrique  [address] [place] [address]        Dear ,    The blood work was normal from your visit; which means it showed no signs of an infectious or inflammatory cause of your pain. The ultrasound of her liver and MRI of your neck will be helpful.    I will be in contact with you once I have the imaging reports.  Please call if you have any questions        Thank you,      Nellie Lockett MD

## 2023-02-15 NOTE — TELEPHONE ENCOUNTER
Routing to provider as FYI as to why pt is being seen. Pt said her R shoulder blade and under R breast is now feeling more itchy, burning, and cramping then when she saw Dr. Haramn for this on 2/3/23. It so severe that pt is unable to sleep and has poor appetite. Her sister is there and she said pt looks really uncomfortable and gets tired easily.     Denies chest pain, SOB, and heavy pressure on chest.    Pt also c/o bad smell in her nose which she talked to Dr. Harman about on 2/3/23.     They are worried about high liver enzyme and explained to them what liver enzyme is and per office visit note on 2/3/23 it was slightly high but not concerning enough for pt to be seen sooner.     Called pt w/ an .     Roselyn Gayle RN on 2/15/2023 at 11:57 AM

## 2023-02-15 NOTE — PROGRESS NOTES
Preceptor Attestation:    I discussed the patient with the resident and evaluated the patient in person. I have verified the content of the note, which accurately reflects my assessment of the patient and the plan of care.   Supervising Physician:  Sai Kline MD.

## 2023-02-15 NOTE — TELEPHONE ENCOUNTER
[facility] phone call message- general phone call:    Reason for call:  Burning on her R shoulder and under her R breast she is having trouble sleeping and has a poor appetite.    Action desired: Call back    Return call needed: Yes    OK to leave a message on voice mail? Yes    Advised patient to response may take up to 2 business days: Yes    Primary language: Danna      needed? Yes    Call taken on February 15, 2023 at 10:53 AM by Patricio Selby

## 2023-02-15 NOTE — PATIENT INSTRUCTIONS
Fluocinonide ointment twice a day to itchy spots     They will call you to schedule the ultrasound of your liver and the MRI of your neck     I will let you know the results of the blood work     Take famotidine daily, I will call your pharmacy to make sure you can pick this up

## 2023-02-21 ENCOUNTER — ANCILLARY PROCEDURE (OUTPATIENT)
Dept: MAMMOGRAPHY | Facility: CLINIC | Age: 53
End: 2023-02-21
Attending: STUDENT IN AN ORGANIZED HEALTH CARE EDUCATION/TRAINING PROGRAM
Payer: COMMERCIAL

## 2023-02-21 DIAGNOSIS — Z00.00 ROUTINE GENERAL MEDICAL EXAMINATION AT A HEALTH CARE FACILITY: ICD-10-CM

## 2023-02-21 PROCEDURE — 77067 SCR MAMMO BI INCL CAD: CPT

## 2023-03-01 ENCOUNTER — OFFICE VISIT (OUTPATIENT)
Dept: FAMILY MEDICINE | Facility: CLINIC | Age: 53
End: 2023-03-01
Payer: COMMERCIAL

## 2023-03-01 VITALS
RESPIRATION RATE: 20 BRPM | HEART RATE: 96 BPM | DIASTOLIC BLOOD PRESSURE: 84 MMHG | WEIGHT: 147.8 LBS | OXYGEN SATURATION: 97 % | HEIGHT: 63 IN | BODY MASS INDEX: 26.19 KG/M2 | TEMPERATURE: 97.9 F | SYSTOLIC BLOOD PRESSURE: 130 MMHG

## 2023-03-01 DIAGNOSIS — E55.9 VITAMIN D DEFICIENCY: ICD-10-CM

## 2023-03-01 DIAGNOSIS — Z00.00 PREVENTATIVE HEALTH CARE: ICD-10-CM

## 2023-03-01 DIAGNOSIS — L28.0 LICHENIFICATION: ICD-10-CM

## 2023-03-01 DIAGNOSIS — H04.123 DRY EYES: ICD-10-CM

## 2023-03-01 DIAGNOSIS — D17.30 LIPOMA OF SKIN AND SUBCUTANEOUS TISSUE: ICD-10-CM

## 2023-03-01 DIAGNOSIS — R20.2 NOTALGIA PARESTHETICA: Primary | ICD-10-CM

## 2023-03-01 DIAGNOSIS — R74.8 ELEVATED LIVER ENZYMES: ICD-10-CM

## 2023-03-01 PROCEDURE — 99214 OFFICE O/P EST MOD 30 MIN: CPT | Performed by: STUDENT IN AN ORGANIZED HEALTH CARE EDUCATION/TRAINING PROGRAM

## 2023-03-01 RX ORDER — CARBOXYMETHYLCELLULOSE SODIUM 5 MG/ML
1 SOLUTION/ DROPS OPHTHALMIC 3 TIMES DAILY PRN
Qty: 15 ML | Refills: 0 | Status: SHIPPED | OUTPATIENT
Start: 2023-03-01 | End: 2023-04-19

## 2023-03-01 RX ORDER — ERGOCALCIFEROL 1.25 MG/1
50000 CAPSULE, LIQUID FILLED ORAL WEEKLY
Qty: 12 CAPSULE | Refills: 0 | Status: SHIPPED | OUTPATIENT
Start: 2023-03-01 | End: 2023-04-19

## 2023-03-01 RX ORDER — GABAPENTIN 300 MG/1
300 CAPSULE ORAL AT BEDTIME
Qty: 90 CAPSULE | Refills: 1 | Status: SHIPPED | OUTPATIENT
Start: 2023-03-01 | End: 2023-04-19

## 2023-03-01 NOTE — PATIENT INSTRUCTIONS
Keep appointment for Ultrasound and MRI appointment for March 7th.      New Medications:  VItamin D 1x per week.   Gabapentin 1x per day at night.   Eye drops.      FOllow up at the end of the month.  (Dr. Lockett)      Bring your medications when you come back in!

## 2023-03-01 NOTE — PROGRESS NOTES
Nursing Notes:   PAULINO FUENTES  3/1/2023  9:47 AM  Signed   name: Colton Boyd  Language: Danna  Agency: [employer]  Phone number: [phone]  Face to face spoken      ASSESSMENT AND PLAN:      Po was seen today for other.  Multiple issues were discussed today.    Diagnoses and all orders for this visit:    Notalgia paresthetica  Lipoma of skin and subcutaneous tissue  Lichenification  Discussed the diagnosis of notalgia paresthetica and that this is a chronic nerve irritation without a complete solution.  She has done well on gabapentin in the past.  We will restart this.  Start with 1 pill daily at bedtime.  Can titrate up as needed.  Continue with the Lidex for areas of skin irritation, and the cetirizine to help with itching and scratching.  -     gabapentin (NEURONTIN) 300 MG capsule; Take 1 capsule (300 mg) by mouth At Bedtime Take 1 tablet (300 mg) every night for 1-3 days, then 1 tablet twice daily for 1-3 days, then 1 tablet three times daily  -    Lidex twice daily as needed.  -    Cetirizine 1 pill daily at night.    Dry eyes  We will try an alternative eyedrop and see if this is covered.  -     carboxymethylcellulose (LUBRICANT EYE DROPS) 0.5 % SOLN ophthalmic solution; Place 1 drop into both eyes 3 times daily as needed for dry eyes    Preventative health care  Vitamin D deficiency  Discussed her normal mammogram, normal fit test, and upcoming imaging.  She is had low vitamin D in the past has not been on this for a number of years and felt better.  We will restart this for her.  -     vitamin D2 (ERGOCALCIFEROL) 12756 units (1250 mcg) capsule; Take 1 capsule (50,000 Units) by mouth once a week    Elevated liver enzymes  Abdominal pain  Work-up thus far has not shown clear etiology.  She has previous normal hepatitis B testing, hepatitis C testing is negative, H. pylori testing negative.  Some improvement with famotidine but still having symptoms.  Has not been able to get her right upper quadrant  ultrasound completed because of the weather.  She has a right upper quadrant ultrasound in the past, showing hepatic steatosis.  No focal mass at that time.  No gallstones.    --Discussed fatty liver and diet and exercise interventions for this.  --Continue with famotidine daily.  -- Complete right upper quadrant ultrasound for further evaluation.  -- She will follow-up in 4 weeks and we will discuss these results and repeat liver function tests at that time.  Broaden work-up if needed.          Patient Instructions   Keep appointment for Ultrasound and MRI appointment for March 7th.      New Medications:  VItamin D 1x per week.   Gabapentin 1x per day at night.   Eye drops.      FOllow up at the end of the month.  (Dr. Lockett)      Bring your medications when you come back in!        Nellie Harman MD    SUBJECTIVE  Po Klaus is a 53 year old female with past medical history significant for    Patient Active Problem List   Diagnosis     Self-limiting autoimmune thyroiditis with transient hyperthyroidism and/or hypothyroidism     Health Care Home     Lump In / On the skin     Vitamin D deficiency     Screening for depression     CTS (carpal tunnel syndrome)     Other chronic nonalcoholic liver disease     Notalgia paresthetica     Lichenification     Dry eyes     Others present at the visit:   Colton Boyd, present in person.      Presents for   Chief Complaint   Patient presents with     Other     Follow-up last visit, haven't done the referral.  Sleeping problem.     Patient presents for follow-up on the sensation of burning, itching, and pain on her right shoulder blade, extending around to the right chest and upper abdomen.  She was seen about 2 weeks ago by Dr. Lockett, and diagnosed with notalgia paresthetica.  Has been using the stronger steroid on her skin and this has been helping.  Has not been able to do the MRI or other imaging as of yet because of bad weather last week.  She continues to  "notice some pain in her shoulder, as well as in her knees, lower legs and feet.  This is stable.    Her abdomen is may be slightly better since starting the famotidine.  She does continue to have some gas pain and discomfort.  Her difficulty swallowing has resolved.  Continues to feel some postnasal drip.  She is requesting a refill of eyedrops as the prescription I sent in was not covered by insurance.  She continues to notice a bad smell in the back of her mouth.  Her nose overall is improved with using the nasal spray.    She is taking 4 pills daily at night.  Does not have them with her and is not sure which ones they are, but think she is taking the cetirizine, famotidine, and Tylenol.  She is having some difficulty sleeping.  Does not get sweaty and clammy or think she has hot flashes.  She does notice worsening in her stomach when she eats traditional foods.  Eating oils makes it worse.    She is wanting to restart vitamin D today.  She took this in the past and noticed improved energy.    She did not receive the results of her mammogram and wanted to discuss these today.  Mammogram was normal.    She is also worried about the abnormal liver test from last visit.  Has not had problems with her liver in the past.  No known history of hepatitis.  Had screening when she arrived in the [place].  The right upper quadrant ultrasound is still pending.    We reviewed her fit test which was negative.  H. pylori testing was also negative.  Hep C screening was negative.      OBJECTIVE:  Vitals: /84   Pulse 96   Temp 97.9  F (36.6  C) (Oral)   Resp 20   Ht 1.605 m (5' 3.2\")   Wt 67 kg (147 lb 12.8 oz)   LMP 01/18/2015 (Approximate)   SpO2 97%   BMI 26.02 kg/m    BMI= Body mass index is 26.02 kg/m .  Objective:    Vitals:  Vitals are reviewed and are within the normal range  Gen:  Alert, pleasant, no acute distress  Cardiac:  Regular rate and rhythm, no murmurs, rubs or gallops  Respiratory:  Lungs clear to " auscultation bilaterally  Back: The rash on her right upper back is significantly improved today.  Still some areas of irritation excoriation but much better.  Abdomen: This is soft she has very mild midepigastric tenderness but no right upper quadrant tenderness.  Bowel sounds are present and active.  No palpable hepatosplenomegaly.    Results were discussed with the patient today as outlined above.  This includes her mammogram, fit test, CBC, BMP, liver function test, cholesterol, thyroid testing, CRP.      Patient Instructions   Keep appointment for Ultrasound and MRI appointment for March 7th.      New Medications:  VItamin D 1x per week.   Gabapentin 1x per day at night.   Eye drops.      FOllow up at the end of the month.  (Dr. Lockett)      Bring your medications when you come back in!        Nellie Harman MD

## 2023-03-01 NOTE — NURSING NOTE
name: Colton Boyd  Language: Danna  Agency: [employer]  Phone number: [phone]  Face to face spoken

## 2023-03-04 PROBLEM — R20.2 NOTALGIA PARESTHETICA: Status: ACTIVE | Noted: 2023-03-04

## 2023-03-04 PROBLEM — L28.0 LICHENIFICATION: Status: ACTIVE | Noted: 2023-03-04

## 2023-03-04 PROBLEM — H04.123 DRY EYES: Status: ACTIVE | Noted: 2023-03-04

## 2023-03-07 ENCOUNTER — HOSPITAL ENCOUNTER (OUTPATIENT)
Dept: ULTRASOUND IMAGING | Facility: HOSPITAL | Age: 53
Discharge: HOME | End: 2023-03-07
Attending: FAMILY MEDICINE | Admitting: FAMILY MEDICINE
Payer: COMMERCIAL

## 2023-03-07 DIAGNOSIS — R10.11 RUQ ABDOMINAL PAIN: ICD-10-CM

## 2023-03-07 PROCEDURE — 76705 ECHO EXAM OF ABDOMEN: CPT

## 2023-03-09 ENCOUNTER — HOSPITAL ENCOUNTER (OUTPATIENT)
Dept: MRI IMAGING | Facility: HOSPITAL | Age: 53
Discharge: HOME | End: 2023-03-09
Attending: FAMILY MEDICINE | Admitting: FAMILY MEDICINE
Payer: COMMERCIAL

## 2023-03-09 DIAGNOSIS — M54.2 NECK PAIN: ICD-10-CM

## 2023-03-09 PROCEDURE — 255N000002 HC RX 255 OP 636: Performed by: FAMILY MEDICINE

## 2023-03-09 PROCEDURE — A9585 GADOBUTROL INJECTION: HCPCS | Performed by: FAMILY MEDICINE

## 2023-03-09 PROCEDURE — 72156 MRI NECK SPINE W/O & W/DYE: CPT

## 2023-03-09 RX ORDER — GADOBUTROL 604.72 MG/ML
7 INJECTION INTRAVENOUS ONCE
Status: COMPLETED | OUTPATIENT
Start: 2023-03-09 | End: 2023-03-09

## 2023-03-09 RX ADMIN — GADOBUTROL 7 ML: 604.72 INJECTION INTRAVENOUS at 17:47

## 2023-04-19 ENCOUNTER — OFFICE VISIT (OUTPATIENT)
Dept: FAMILY MEDICINE | Facility: CLINIC | Age: 53
End: 2023-04-19
Payer: COMMERCIAL

## 2023-04-19 VITALS
DIASTOLIC BLOOD PRESSURE: 75 MMHG | HEART RATE: 86 BPM | TEMPERATURE: 98.1 F | SYSTOLIC BLOOD PRESSURE: 118 MMHG | WEIGHT: 148.4 LBS | OXYGEN SATURATION: 100 % | BODY MASS INDEX: 26.12 KG/M2 | RESPIRATION RATE: 20 BRPM

## 2023-04-19 DIAGNOSIS — L28.0 LICHENIFICATION: ICD-10-CM

## 2023-04-19 DIAGNOSIS — R20.2 NOTALGIA PARESTHETICA: ICD-10-CM

## 2023-04-19 DIAGNOSIS — K76.0 HEPATIC STEATOSIS: ICD-10-CM

## 2023-04-19 DIAGNOSIS — R74.8 ELEVATED LIVER ENZYMES: ICD-10-CM

## 2023-04-19 DIAGNOSIS — R14.3 FLATULENCE, ERUCTATION AND GAS PAIN: Primary | ICD-10-CM

## 2023-04-19 DIAGNOSIS — H04.123 DRY EYES: ICD-10-CM

## 2023-04-19 DIAGNOSIS — R14.1 FLATULENCE, ERUCTATION AND GAS PAIN: Primary | ICD-10-CM

## 2023-04-19 DIAGNOSIS — R09.81 NASAL CONGESTION: ICD-10-CM

## 2023-04-19 DIAGNOSIS — M48.02 CERVICAL SPINAL STENOSIS: ICD-10-CM

## 2023-04-19 DIAGNOSIS — R14.2 FLATULENCE, ERUCTATION AND GAS PAIN: Primary | ICD-10-CM

## 2023-04-19 PROCEDURE — 99214 OFFICE O/P EST MOD 30 MIN: CPT | Performed by: STUDENT IN AN ORGANIZED HEALTH CARE EDUCATION/TRAINING PROGRAM

## 2023-04-19 RX ORDER — CARBOXYMETHYLCELLULOSE SODIUM 5 MG/ML
1 SOLUTION/ DROPS OPHTHALMIC 3 TIMES DAILY PRN
Qty: 15 ML | Refills: 5 | Status: SHIPPED | OUTPATIENT
Start: 2023-04-19 | End: 2023-10-11

## 2023-04-19 RX ORDER — ACETAMINOPHEN 500 MG
1000 TABLET ORAL EVERY 6 HOURS PRN
Qty: 60 TABLET | Refills: 1 | Status: SHIPPED | OUTPATIENT
Start: 2023-04-19 | End: 2023-07-28

## 2023-04-19 RX ORDER — FAMOTIDINE 40 MG/1
40 TABLET, FILM COATED ORAL DAILY
Qty: 90 TABLET | Refills: 1 | Status: SHIPPED | OUTPATIENT
Start: 2023-04-19 | End: 2023-10-11

## 2023-04-19 RX ORDER — GABAPENTIN 300 MG/1
300 CAPSULE ORAL 2 TIMES DAILY
Qty: 180 CAPSULE | Refills: 3 | Status: SHIPPED | OUTPATIENT
Start: 2023-04-19 | End: 2023-10-11

## 2023-04-19 RX ORDER — FLUOCINONIDE 0.5 MG/G
OINTMENT TOPICAL 2 TIMES DAILY
Qty: 60 G | Refills: 1 | Status: SHIPPED | OUTPATIENT
Start: 2023-04-19 | End: 2023-10-11

## 2023-04-19 RX ORDER — FLUTICASONE PROPIONATE 50 MCG
1 SPRAY, SUSPENSION (ML) NASAL DAILY
Qty: 15.8 ML | Refills: 3 | Status: SHIPPED | OUTPATIENT
Start: 2023-04-19 | End: 2024-04-30

## 2023-04-19 RX ORDER — SIMETHICONE 80 MG
80 TABLET,CHEWABLE ORAL EVERY 6 HOURS PRN
Qty: 100 TABLET | Refills: 3 | Status: SHIPPED | OUTPATIENT
Start: 2023-04-19 | End: 2023-10-11

## 2023-04-19 RX ORDER — CETIRIZINE HYDROCHLORIDE 10 MG/1
10 TABLET ORAL DAILY
Qty: 90 TABLET | Refills: 1 | Status: SHIPPED | OUTPATIENT
Start: 2023-04-19 | End: 2023-10-11

## 2023-04-19 NOTE — PATIENT INSTRUCTIONS
Refill medications:    Allergy medications  Stomach medications  Cream for rash    MRI looks stable - there is some pinching but this is stable.   Liver tests are also stable.  This is good!    Schedule an appointment with the dentist.  Make sure you are taking the allergy medication - pill ceterizine and nasal spray.      FOllow up in 3 months  
Clear

## 2023-04-19 NOTE — PROGRESS NOTES
There are no exam notes on file for this visit.    ASSESSMENT AND PLAN:      Po was seen today for discussion of lab results and review and refill medications.    Diagnoses and all orders for this visit:    Notalgia paresthetica  Cervical spinal stenosis with foraminal narrowing.  Lichenification  Reviewed cervical imaging.  Reviewed diagnosis of notalgia paresthetica.  Continue with gabapentin twice daily.  Use Tylenol for breakthrough pain.  Use cream for areas of irritation and itching.  Keith chronic nature of this, and warning signs to return including weakness or worsening pain.  -     fluocinonide (LIDEX) 0.05 % external ointment; Apply topically 2 times daily  -     gabapentin (NEURONTIN) 300 MG capsule; Take 1 capsule (300 mg) by mouth 2 times daily  -     acetaminophen (ACETAMINOPHEN EXTRA STRENGTH) 500 MG tablet; Take 2 tablets (1,000 mg) by mouth every 6 hours as needed for mild pain    Elevated liver enzymes  Hepatic steatosis  Flatulence, eructation and gas pain  Discussed liver enzyme elevation, hepatic ketosis, diet interventions.  Okay to continue with Pepcid and simethicone for symptoms, but I do not think the fatty liver is causing her symptoms it just is leading to elevated liver enzymes.  Discussed healthy diet.  -     simethicone (MYLICON) 80 MG chewable tablet; Take 1 tablet (80 mg) by mouth every 6 hours as needed for flatulence or cramping  -     famotidine (PEPCID) 40 MG tablet; Take 1 tablet (40 mg) by mouth daily      Dry eyes  Refill medication.  -     carboxymethylcellulose (LUBRICANT EYE DROPS) 0.5 % SOLN ophthalmic solution; Place 1 drop into both eyes 3 times daily as needed for dry eyes  -     cetirizine (ZYRTEC) 10 MG tablet; Take 1 tablet (10 mg) by mouth daily    Nasal congestion  Post nasal drip  Mouth Odor  Discussed the importance of seeing a does not test regularly.  We will restart allergy medications.  Counseled around regular dental care and betel nut use  -      fluticasone (FLONASE) 50 MCG/ACT nasal spray; Spray 1 spray into both nostrils daily  -     cetirizine (ZYRTEC) 10 MG tablet; Take 1 tablet (10 mg) by mouth daily    Follow-up in 2 to 3 months to ensure medications are working well and that symptoms are stable.    Patient Instructions   Refill medications:    Allergy medications  Stomach medications  Cream for rash    MRI looks stable - there is some pinching but this is stable.   Liver tests are also stable.  This is good!    Schedule an appointment with the dentist.  Make sure you are taking the allergy medication - pill ceterizine and nasal spray.      FOllow up in 3 months      Nellie Harman MD    SHARON Enrique is a 53 year old female with past medical history significant for    Patient Active Problem List   Diagnosis     Self-limiting autoimmune thyroiditis with transient hyperthyroidism and/or hypothyroidism     Health Care Home     Lump In / On the skin     Vitamin D deficiency     Screening for depression     CTS (carpal tunnel syndrome)     Other chronic nonalcoholic liver disease     Notalgia paresthetica     Lichenification     Dry eyes     Hepatic steatosis     Others present at the visit: Danna Nelly present in person    Presents for   Chief Complaint   Patient presents with     Other     Follow-up mri result, itchy skin and hot flash     Patient presents today for follow-up of her symptoms.  She had been diagnosed with notalgia paresthetica, and had a MRI completed for evaluation of cervical neck pain and right upper quadrant ultrasound for evaluation of elevated liver function test.    She has been taking gabapentin regularly to help with the pain and has been feeling better.  She is also doing some stretching and massage and this is helping as well.  The rash is getting better with the cream.    She does still notice some difficulty with mucus that is in the back of her throat that smells bad.  Has not been doing the  Flonase and cetirizine lately and would like a refill on these.  Also requesting a refill on her eyedrops, stomach medications, and gas pills.    We discussed her results.  Her cervical spine MRI shows stable disc space narrowing on C5-6 and 6 7.  This is unchanged from 2018.  Her right upper quadrant ultrasound shows evidence of fatty liver but no other changes.  No gallstones, and this too was unchanged from previous ultrasound.    We discussed medications to help with the nerve pain, and she is doing well on the gabapentin.  She is taking 1 pill twice daily.    Discussed lifestyle changes to help with the fatty liver.  She is working on healthy diet.  For meats is only eating chicken and fish, eating lots of fruits and vegetables, and works on being active, walking and stretching daily.  She shares that she is worried about further damage to her liver.    She still has not been in to see a dentist.  Has been told she needs to have all her teeth pulled.  She chews betel nut regularly.    OBJECTIVE:  Vitals: /75   Pulse 86   Temp 98.1  F (36.7  C) (Oral)   Resp 20   Wt 67.3 kg (148 lb 6.4 oz)   LMP 01/18/2015 (Approximate)   SpO2 100%   BMI 26.12 kg/m    BMI= Body mass index is 26.12 kg/m .  Objective:    Vitals:  Vitals are reviewed and are within the normal range  HEENT: Significant tooth decay present bilaterally.  Gen:  Alert, pleasant, no acute distress  Cardiac:  Regular rate and rhythm, no murmurs, rubs or gallops  Respiratory:  Lungs clear to auscultation bilaterally  Back: The rash on her back is improved and less excoriated today.  Extremities: Able to move her arms and neck in full range of motion without discomfort.    The imaging results below were reviewed with her using the Danna .    EXAM: MR CERVICAL SPINE W/O and W CONTRAST  LOCATION: [facility]  DATE/TIME: 3/9/2023 6:04 PM     INDICATION: Neck pain; r o Spinal infection; Fever chills; No known  automatically detected potential contraindications to imaging  COMPARISON: MRI cervical spine 07/16/2018  CONTRAST: 7 ml gadavist  TECHNIQUE: MRI Cervical Spine without and with IV contrast.     FINDINGS:   Normal vertebral body heights. Unchanged reversal of the normal cervical lordosis. Retrolisthesis of C6 on C7, slightly worsened compared to the prior study. No abnormal cord signal. No extraspinal abnormality.     Craniovertebral junction and C1-C2: Normal.     C2-C3: Normal disc height. No herniation. Normal facets. No spinal canal or neural foraminal stenosis.      C3-C4: Normal disc height. No herniation. Normal facets. No spinal canal or neural foraminal stenosis.      C4-C5: Normal disc height. No herniation. Normal facets. No spinal canal or neural foraminal stenosis.      C5-C6: Unchanged mild loss of disc height. Unchanged small central disc aspect complex. No facet arthropathy. No spinal canal stenosis. No right and unchanged moderate left neural foraminal stenosis.      C6-C7: Unchanged mild loss of disc height. No facet arthropathy. Unchanged mild spinal canal stenosis. Unchanged mild to moderate bilateral neural foraminal stenosis.      C7-T1: Normal disc height. No herniation. Normal facets. No spinal canal or neural foraminal stenosis.     No abnormal postcontrast enhancement.                                                                      IMPRESSION:  1.  No evidence of infection in the cervical spine.      2.  When compared to 07/16/2018, at C5-C6, there is unchanged moderate left neural foraminal stenosis.     3.  At C6-C7, there is unchanged mild to moderate bilateral neural foraminal stenosis.    EXAM: US ABDOMEN LIMITED  LOCATION: [facility]  DATE/TIME: 3/7/2023 8:36 AM     INDICATION: RUQ pain  COMPARISON: 07/16/2018.  TECHNIQUE: Limited abdominal ultrasound.     FINDINGS:     GALLBLADDER: Normal. No gallstones, wall thickening, or pericholecystic fluid.  Negative sonographic Castaneda's sign.     BILE DUCTS: No biliary dilatation. The common duct measures 6.7 mm.     LIVER: Mild diffuse increased echogenicity consistent with mild hepatic steatosis. Smooth hepatic contour. No focal mass. The main portal vein is patent with flow in the normal direction.     RIGHT KIDNEY: No hydronephrosis.     PANCREAS: The visualized portions are normal.     No ascites.                                                                      IMPRESSION:  1.  Mild diffuse increased echogenicity in the liver consistent with mild hepatic steatosis. No focal hepatic mass or biliary dilatation.  2.  Normal sonographic appearance of the gallbladder and visualized pancreas. No ascites.           Patient Instructions   Refill medications:    Allergy medications  Stomach medications  Cream for rash    MRI looks stable - there is some pinching but this is stable.   Liver tests are also stable.  This is good!    Schedule an appointment with the dentist.  Make sure you are taking the allergy medication - pill ceterizine and nasal spray.      FOllow up in 3 months      Nellie Harman MD

## 2023-04-26 ENCOUNTER — TELEPHONE (OUTPATIENT)
Dept: FAMILY MEDICINE | Facility: CLINIC | Age: 53
End: 2023-04-26

## 2023-04-26 DIAGNOSIS — R14.2 FLATULENCE, ERUCTATION AND GAS PAIN: Primary | ICD-10-CM

## 2023-04-26 DIAGNOSIS — R14.1 FLATULENCE, ERUCTATION AND GAS PAIN: Primary | ICD-10-CM

## 2023-04-26 DIAGNOSIS — R14.3 FLATULENCE, ERUCTATION AND GAS PAIN: Primary | ICD-10-CM

## 2023-04-26 RX ORDER — SIMETHICONE 125 MG
125 TABLET,CHEWABLE ORAL 2 TIMES DAILY
Qty: 60 TABLET | Refills: 3 | Status: SHIPPED | OUTPATIENT
Start: 2023-04-26 | End: 2023-10-11

## 2023-04-26 NOTE — TELEPHONE ENCOUNTER
Alternative dose of same medication sent.  If that is not covered, I can try a different option.    Nellie Harman MD    Routed to MOSHE Harvey.

## 2023-06-05 ENCOUNTER — HOSPITAL ENCOUNTER (EMERGENCY)
Facility: HOSPITAL | Age: 53
Discharge: HOME | End: 2023-06-05
Attending: EMERGENCY MEDICINE | Admitting: EMERGENCY MEDICINE
Payer: COMMERCIAL

## 2023-06-05 VITALS
SYSTOLIC BLOOD PRESSURE: 120 MMHG | OXYGEN SATURATION: 97 % | DIASTOLIC BLOOD PRESSURE: 56 MMHG | TEMPERATURE: 98.3 F | HEART RATE: 75 BPM | RESPIRATION RATE: 16 BRPM

## 2023-06-05 DIAGNOSIS — J01.90 ACUTE BACTERIAL SINUSITIS: ICD-10-CM

## 2023-06-05 DIAGNOSIS — B96.89 ACUTE BACTERIAL SINUSITIS: ICD-10-CM

## 2023-06-05 LAB
ANION GAP SERPL CALCULATED.3IONS-SCNC: 9 MMOL/L (ref 7–15)
BASOPHILS # BLD AUTO: 0 10E3/UL (ref 0–0.2)
BASOPHILS NFR BLD AUTO: 1 %
BUN SERPL-MCNC: 10.8 MG/DL (ref 6–20)
CALCIUM SERPL-MCNC: 9.5 MG/DL (ref 8.6–10)
CHLORIDE SERPL-SCNC: 103 MMOL/L (ref 98–107)
CREAT SERPL-MCNC: 0.74 MG/DL (ref 0.51–0.95)
CRP SERPL-MCNC: 7.2 MG/L
DEPRECATED HCO3 PLAS-SCNC: 29 MMOL/L (ref 22–29)
EOSINOPHIL # BLD AUTO: 0.1 10E3/UL (ref 0–0.7)
EOSINOPHIL NFR BLD AUTO: 1 %
ERYTHROCYTE [DISTWIDTH] IN BLOOD BY AUTOMATED COUNT: 13.4 % (ref 10–15)
GFR SERPL CREATININE-BSD FRML MDRD: >90 ML/MIN/1.73M2
GLUCOSE SERPL-MCNC: 88 MG/DL (ref 70–99)
HCT VFR BLD AUTO: 43.3 % (ref 35–47)
HGB BLD-MCNC: 14 G/DL (ref 11.7–15.7)
HOLD SPECIMEN: NORMAL
IMM GRANULOCYTES # BLD: 0 10E3/UL
IMM GRANULOCYTES NFR BLD: 0 %
LYMPHOCYTES # BLD AUTO: 1.8 10E3/UL (ref 0.8–5.3)
LYMPHOCYTES NFR BLD AUTO: 23 %
MCH RBC QN AUTO: 31.5 PG (ref 26.5–33)
MCHC RBC AUTO-ENTMCNC: 32.3 G/DL (ref 31.5–36.5)
MCV RBC AUTO: 98 FL (ref 78–100)
MONOCYTES # BLD AUTO: 1 10E3/UL (ref 0–1.3)
MONOCYTES NFR BLD AUTO: 13 %
NEUTROPHILS # BLD AUTO: 4.9 10E3/UL (ref 1.6–8.3)
NEUTROPHILS NFR BLD AUTO: 62 %
NRBC # BLD AUTO: 0 10E3/UL
NRBC BLD AUTO-RTO: 0 /100
PLATELET # BLD AUTO: 269 10E3/UL (ref 150–450)
POTASSIUM SERPL-SCNC: 3.4 MMOL/L (ref 3.4–5.3)
RBC # BLD AUTO: 4.44 10E6/UL (ref 3.8–5.2)
SODIUM SERPL-SCNC: 141 MMOL/L (ref 136–145)
WBC # BLD AUTO: 7.7 10E3/UL (ref 4–11)

## 2023-06-05 PROCEDURE — 96374 THER/PROPH/DIAG INJ IV PUSH: CPT

## 2023-06-05 PROCEDURE — 36415 COLL VENOUS BLD VENIPUNCTURE: CPT

## 2023-06-05 PROCEDURE — 99284 EMERGENCY DEPT VISIT MOD MDM: CPT | Mod: 25

## 2023-06-05 PROCEDURE — 96361 HYDRATE IV INFUSION ADD-ON: CPT

## 2023-06-05 PROCEDURE — 85025 COMPLETE CBC W/AUTO DIFF WBC: CPT

## 2023-06-05 PROCEDURE — 258N000003 HC RX IP 258 OP 636

## 2023-06-05 PROCEDURE — 80048 BASIC METABOLIC PNL TOTAL CA: CPT

## 2023-06-05 PROCEDURE — 86140 C-REACTIVE PROTEIN: CPT

## 2023-06-05 PROCEDURE — 250N000011 HC RX IP 250 OP 636

## 2023-06-05 PROCEDURE — 96375 TX/PRO/DX INJ NEW DRUG ADDON: CPT

## 2023-06-05 RX ORDER — METOCLOPRAMIDE HYDROCHLORIDE 5 MG/ML
10 INJECTION INTRAMUSCULAR; INTRAVENOUS ONCE
Status: COMPLETED | OUTPATIENT
Start: 2023-06-05 | End: 2023-06-05

## 2023-06-05 RX ORDER — NAPROXEN 250 MG/1
250-500 TABLET ORAL 2 TIMES DAILY PRN
Qty: 20 TABLET | Refills: 0 | Status: SHIPPED | OUTPATIENT
Start: 2023-06-05 | End: 2023-06-27

## 2023-06-05 RX ORDER — KETOROLAC TROMETHAMINE 15 MG/ML
15 INJECTION, SOLUTION INTRAMUSCULAR; INTRAVENOUS ONCE
Status: COMPLETED | OUTPATIENT
Start: 2023-06-05 | End: 2023-06-05

## 2023-06-05 RX ADMIN — METOCLOPRAMIDE 10 MG: 5 INJECTION, SOLUTION INTRAMUSCULAR; INTRAVENOUS at 11:17

## 2023-06-05 RX ADMIN — SODIUM CHLORIDE 1000 ML: 9 INJECTION, SOLUTION INTRAVENOUS at 11:17

## 2023-06-05 RX ADMIN — KETOROLAC TROMETHAMINE 15 MG: 15 INJECTION INTRAMUSCULAR; INTRAVENOUS at 11:17

## 2023-06-05 ASSESSMENT — ACTIVITIES OF DAILY LIVING (ADL)
ADLS_ACUITY_SCORE: 35
ADLS_ACUITY_SCORE: 35

## 2023-06-05 NOTE — ED PROVIDER NOTES
I, Laine Iqbal MD have reviewed the documentation, personally taken the patient's history, performed an exam and agree with the physical finds, diagnosis and management plan. I saw this patient with Beryl Daigle MD.    ED Course as of 06/05/23 1233   Mon Jun 05, 2023   1103 I discussed the case with Dr. Oxana MD.   1111 Met with patient, performed history and examination using telephone Danna . Patient with 1 month history of purulent nasal discharge and 3-4 day history of severe left-sided facial pain and headache with photophobia. Will start with IV placement, labs, IV fluids, reglan, and toradol. If not symptomatically improved, will obtain CT head.   1120 Patient is a 53-year-old female that comes in today for evaluation of a headache.  She reports that it is a left-sided headache and its been going on for about a week now.  She has some facial pain on that side.  She reports a purulent discharge.  She had complained of a little numbness and tingling earlier but is not having of symptoms now.  She denies any associated weakness.  She appears uncomfortable and has tenderness over the maxillary sinus on the left.  She is not toxic appearing.  She is not febrile on arrival.  We will treat her symptomatically with some Toradol and Reglan and some IV fluids and check some basic labs and see what we find.  I think since the purulent discharge has been going on for multiple weeks we could consider antibiotics for her.  Patient is comfortable with the plan.   1224 Is feeling better and wants to discharge home.  She will follow-up as an outpatient with her primary care doctor.       I personally saw the patient and performed a substantive portion of the visit including all aspects of the medical decision making.       Laine Iqbal MD  06/05/23 1233

## 2023-06-05 NOTE — ED TRIAGE NOTES
Pt presents to triage ambulatory for headache. Pt reports L sided HA started last week but got worse yesterday. Pt was seen at clinic this morning and given tylenol which helped a little bit. +Photophobia with HA but no changes to vision.

## 2023-06-05 NOTE — ED PROVIDER NOTES
EMERGENCY DEPARTMENT ENCOUNTER      NAME: Saman Enrique  AGE: 53 year old female  YOB: 1970  MRN: [id number]  EVALUATION DATE & TIME: 6/5/2023 10:29 AM    PCP: Nellie Harman    ED PROVIDER: Beryl Daigle MD    Chief Complaint   Patient presents with     Headache     FINAL IMPRESSION:  1. Acute bacterial sinusitis        ED COURSE & MEDICAL DECISION MAKING:    Pertinent Labs & Imaging studies reviewed. (See chart for details)  53 year old female presents to the Emergency Department for evaluation of headache for almost a week now with purulent nasal discharge that is foul-smelling for 1 month. She appears uncomfortable and has tenderness over the maxillary sinus. She was given 1L IV fluids, IV reglan and IV toradol with marked improvement in her symptoms. She had normal CBC, BMP, and mildly elevated CRP. She is wanting to discharge to home. This is consistent with acute bacterial sinusitis +/- migraine headache. Will treat her with augmentin and naproxen for pain. She should follow-up with her PCP in 1 week.    ED Course as of 06/05/23 1226   Mon Jun 05, 2023   1103 I discussed the case with Dr. Oxana MD.   1111 Met with patient, performed history and examination using telephone Danna . Patient with 1 month history of purulent nasal discharge and 3-4 day history of severe left-sided facial pain and headache with photophobia. Will start with IV placement, labs, IV fluids, reglan, and toradol. If not symptomatically improved, will obtain CT head.   1120 Patient is a 53-year-old female that comes in today for evaluation of a headache.  She reports that it is a left-sided headache and its been going on for about a week now.  She has some facial pain on that side.  She reports a purulent discharge.  She had complained of a little numbness and tingling earlier but is not having of symptoms now.  She denies any associated weakness.  She appears uncomfortable and has tenderness over the  maxillary sinus on the left.  She is not toxic appearing.  She is not febrile on arrival.  We will treat her symptomatically with some Toradol and Reglan and some IV fluids and check some basic labs and see what we find.  I think since the purulent discharge has been going on for multiple weeks we could consider antibiotics for her.  Patient is comfortable with the plan.   1224 Is feeling better and wants to discharge home.  She will follow-up as an outpatient with her primary care doctor.     MEDICATIONS GIVEN IN THE EMERGENCY:  Medications   sodium chloride (PF) 0.9% PF flush 3 mL (has no administration in time range)   ketorolac (TORADOL) injection 15 mg (15 mg Intravenous $Given 6/5/23 1117)   metoclopramide (REGLAN) injection 10 mg (10 mg Intravenous $Given 6/5/23 1117)   0.9% sodium chloride BOLUS (1,000 mLs Intravenous $New Bag 6/5/23 1117)     NEW PRESCRIPTIONS STARTED AT TODAY'S ER VISIT  New Prescriptions    AMOXICILLIN-CLAVULANATE (AUGMENTIN) 875-125 MG TABLET    Take 1 tablet by mouth 2 times daily for 7 days    NAPROXEN (NAPROSYN) 250 MG TABLET    Take 1-2 tablets (250-500 mg) by mouth 2 times daily as needed for headaches     =================================================================    HPI    Patient information was obtained from: patient    Use of : Yes (Phone) - Language Danna Enrique is a 53 year old female who presents to this ED with daughter for evaluation of headache that has been going on for several days, worsened in the last couple of days. It is throbbing, located over her left eye, left side of her nose and cheek, and up into the front left side of her head. She has also had purulent foul-smelling nasal discharge for the past month or so. Flonase and zyrtec were not helpful for the runny nose. She is sensitive to the light but loud sounds do not make her headache worse. She has not had any nausea, vomiting, or diarrhea. She has had subjective fevers at home but is  taking tylenol frequently (unable to say how frequently) and has not documented any temperatures over 100.4F. She has never had a headache like this before. She has not had any weakness in her arms or legs. Her right arm and leg were tingly at some point in the last week but not right now and that was very mild.     PAST MEDICAL HISTORY:  Past Medical History:   Diagnosis Date     CVA (cerebral infarction)      Hepatitis B immune      History of blood transfusion      History of chicken pox      PAST SURGICAL HISTORY:  Past Surgical History:   Procedure Laterality Date     CYST REMOVAL       CURRENT MEDICATIONS:    amoxicillin-clavulanate (AUGMENTIN) 875-125 MG tablet  naproxen (NAPROSYN) 250 MG tablet  acetaminophen (ACETAMINOPHEN EXTRA STRENGTH) 500 MG tablet  carboxymethylcellulose (LUBRICANT EYE DROPS) 0.5 % SOLN ophthalmic solution  cetirizine (ZYRTEC) 10 MG tablet  famotidine (PEPCID) 40 MG tablet  fluocinonide (LIDEX) 0.05 % external ointment  fluticasone (FLONASE) 50 MCG/ACT nasal spray  gabapentin (NEURONTIN) 300 MG capsule  simethicone (MYLICON) 125 MG chewable tablet  simethicone (MYLICON) 80 MG chewable tablet    ALLERGIES:  Allergies   Allergen Reactions     Nkda [No Known Drug Allergy]      FAMILY HISTORY:  Family History   Problem Relation Age of Onset     Diabetes Other         mother in law     Coronary Artery Disease No family hx of      Hypertension No family hx of      Hyperlipidemia No family hx of      Cerebrovascular Disease No family hx of      Breast Cancer No family hx of      Colon Cancer No family hx of      Prostate Cancer No family hx of      Other Cancer No family hx of      Depression No family hx of      Anxiety Disorder No family hx of      Mental Illness No family hx of      Substance Abuse No family hx of      Anesthesia Reaction No family hx of      Asthma No family hx of      Osteoporosis No family hx of      Genetic Disorder No family hx of      Thyroid Disease No family hx of       Obesity No family hx of      Unknown/Adopted No family hx of      No Known Problems Mother      Kidney Disease Father      SOCIAL HISTORY:   Social History     Socioeconomic History     Marital status:    Tobacco Use     Smoking status: Never     Smokeless tobacco: Never   Vaping Use     Vaping status: Never Used   Substance and Sexual Activity     Alcohol use: No     Drug use: No     VITALS:  BP (!) 140/80   Pulse 79   Temp 98.3  F (36.8  C) (Oral)   Resp 16   LMP 01/18/2015 (Approximate)   SpO2 98%     PHYSICAL EXAM    Constitutional: Well developed, Well nourished, NAD, prefers to keep eyes closed.  HENT: Normocephalic, Atraumatic, Bilateral external ears normal, Oropharynx normal, mucous membranes moist, Nose normal. Tenderness to palpation over left frontal and maxillary sinuses.  Eyes: PERRL, EOMI, Conjunctiva normal, No discharge. Sensitive to light.  Respiratory: Normal breath sounds, No respiratory distress, No wheezing, Speaks full sentences easily. No cough.   Cardiovascular: Normal heart rate, Regular rhythm, No murmurs, No rubs, No gallops. Chest wall nontender.  Integument: Warm, Dry, No erythema, No rash. No petechiae.   Neurologic: Alert & oriented x 3, Normal motor function, Normal sensory function, No focal deficits noted. Normal gait.    Psychiatric: Affect normal, Judgment normal, Mood normal. Cooperative.      LAB:  All pertinent labs reviewed and interpreted.  Results for orders placed or performed during the hospital encounter of 06/05/23   Extra Blue Top Tube   Result Value Ref Range    Hold Specimen JIC    Extra Red Top Tube   Result Value Ref Range    Hold Specimen JIC    Extra Green Top (Lithium Heparin) Tube   Result Value Ref Range    Hold Specimen JIC    Extra Purple Top Tube   Result Value Ref Range    Hold Specimen JIC    Basic metabolic panel   Result Value Ref Range    Sodium 141 136 - 145 mmol/L    Potassium 3.4 3.4 - 5.3 mmol/L    Chloride 103 98 - 107 mmol/L     Carbon Dioxide (CO2) 29 22 - 29 mmol/L    Anion Gap 9 7 - 15 mmol/L    Urea Nitrogen 10.8 6.0 - 20.0 mg/dL    Creatinine 0.74 0.51 - 0.95 mg/dL    Calcium 9.5 8.6 - 10.0 mg/dL    Glucose 88 70 - 99 mg/dL    GFR Estimate >90 >60 mL/min/1.73m2   Result Value Ref Range    CRP Inflammation 7.20 (H) <5.00 mg/L   CBC with platelets and differential   Result Value Ref Range    WBC Count 7.7 4.0 - 11.0 10e3/uL    RBC Count 4.44 3.80 - 5.20 10e6/uL    Hemoglobin 14.0 11.7 - 15.7 g/dL    Hematocrit 43.3 35.0 - 47.0 %    MCV 98 78 - 100 fL    MCH 31.5 26.5 - 33.0 pg    MCHC 32.3 31.5 - 36.5 g/dL    RDW 13.4 10.0 - 15.0 %    Platelet Count 269 150 - 450 10e3/uL    % Neutrophils 62 %    % Lymphocytes 23 %    % Monocytes 13 %    % Eosinophils 1 %    % Basophils 1 %    % Immature Granulocytes 0 %    NRBCs per 100 WBC 0 <1 /100    Absolute Neutrophils 4.9 1.6 - 8.3 10e3/uL    Absolute Lymphocytes 1.8 0.8 - 5.3 10e3/uL    Absolute Monocytes 1.0 0.0 - 1.3 10e3/uL    Absolute Eosinophils 0.1 0.0 - 0.7 10e3/uL    Absolute Basophils 0.0 0.0 - 0.2 10e3/uL    Absolute Immature Granulocytes 0.0 <=0.4 10e3/uL    Absolute NRBCs 0.0 10e3/uL       Beryl Daigle MD  [facility], PGY-2  Contact via [employer] or pager #: [phone].    Patient staffed with Dr. Iqbal.

## 2023-06-28 ENCOUNTER — OFFICE VISIT (OUTPATIENT)
Dept: FAMILY MEDICINE | Facility: CLINIC | Age: 53
End: 2023-06-28
Payer: COMMERCIAL

## 2023-06-28 VITALS
BODY MASS INDEX: 26.4 KG/M2 | OXYGEN SATURATION: 98 % | SYSTOLIC BLOOD PRESSURE: 125 MMHG | WEIGHT: 150 LBS | RESPIRATION RATE: 16 BRPM | TEMPERATURE: 98.4 F | HEART RATE: 64 BPM | DIASTOLIC BLOOD PRESSURE: 79 MMHG

## 2023-06-28 DIAGNOSIS — R09.82 POST-NASAL DRIP: ICD-10-CM

## 2023-06-28 DIAGNOSIS — R09.89 PHLEGM IN THROAT: ICD-10-CM

## 2023-06-28 DIAGNOSIS — J30.2 SEASONAL ALLERGIC RHINITIS, UNSPECIFIED TRIGGER: Primary | ICD-10-CM

## 2023-06-28 PROCEDURE — 99213 OFFICE O/P EST LOW 20 MIN: CPT | Mod: GC | Performed by: STUDENT IN AN ORGANIZED HEALTH CARE EDUCATION/TRAINING PROGRAM

## 2023-06-28 RX ORDER — AZELASTINE HCL 205.5 UG/1
1 SPRAY NASAL DAILY
Qty: 30 ML | Refills: 1 | Status: SHIPPED | OUTPATIENT
Start: 2023-06-28 | End: 2023-10-11

## 2023-06-28 RX ORDER — GUAIFENESIN 600 MG/1
1200 TABLET, EXTENDED RELEASE ORAL 2 TIMES DAILY
Qty: 30 TABLET | Refills: 1 | Status: SHIPPED | OUTPATIENT
Start: 2023-06-28 | End: 2023-10-11

## 2023-06-28 NOTE — PROGRESS NOTES
Assessment & Plan     Seasonal allergic rhinitis, unspecified trigger  Post-nasal drip  Vital signs are within normal limits, patient continues to have some clear postnasal drip likely secondary to seasonal allergies and rhinitis.  I recommended we add additional azelastine for symptom control and also can take Mucinex as needed for thick phlegm production in her throat.  She otherwise feels well and close to her baseline follow-up as needed for further symptom management control.  - azelastine (ASTEPRO) 0.15 % nasal spray; Spray 1 spray into both nostrils daily  - guaiFENesin (MUCINEX) 600 MG 12 hr tablet; Take 2 tablets (1,200 mg) by mouth 2 times daily    Phlegm in throat  - azelastine (ASTEPRO) 0.15 % nasal spray; Spray 1 spray into both nostrils daily  - guaiFENesin (MUCINEX) 600 MG 12 hr tablet; Take 2 tablets (1,200 mg) by mouth 2 times daily    Return if symptoms worsen or fail to improve.    Gerald Guerrero MD  [facility] is a 53 year old, presenting for the following health issues:  Follow Up, Hospital F/U (Went to er due to headache ), and Throat Problem (Pt states Plenge that comes out from her throat smell really bad and pt feel unconfortable )        4/19/2023     9:54 AM   Additional Questions   Roomed by wes   Accompanied by self     Patient Active Problem List   Diagnosis     Self-limiting autoimmune thyroiditis with transient hyperthyroidism and/or hypothyroidism     Health Care Home     Lump In / On the skin     Vitamin D deficiency     Screening for depression     CTS (carpal tunnel syndrome)     Other chronic nonalcoholic liver disease     Notalgia paresthetica     Lichenification     Dry eyes     Hepatic steatosis     Seasonal allergic rhinitis, unspecified trigger     Current Outpatient Medications   Medication     acetaminophen (ACETAMINOPHEN EXTRA STRENGTH) 500 MG tablet     azelastine (ASTEPRO) 0.15 % nasal spray     carboxymethylcellulose  (LUBRICANT EYE DROPS) 0.5 % SOLN ophthalmic solution     cetirizine (ZYRTEC) 10 MG tablet     famotidine (PEPCID) 40 MG tablet     fluocinonide (LIDEX) 0.05 % external ointment     fluticasone (FLONASE) 50 MCG/ACT nasal spray     gabapentin (NEURONTIN) 300 MG capsule     guaiFENesin (MUCINEX) 600 MG 12 hr tablet     naproxen (NAPROSYN) 250 MG tablet     simethicone (MYLICON) 125 MG chewable tablet     simethicone (MYLICON) 80 MG chewable tablet     No current facility-administered medications for this visit.       HPI     Patient is a 53-year-old with a history of seasonal allergic rhinitis on Flonase, Zyrtec.  She was evaluated on 6/5 for bacterial sinusitis was treated with a course of Augmentin.  She states that she no longer has a headache, purulent nasal drainage and feels mostly improved.  Still have some nasal congestion and feels like there is mucus stuck in the back of her throat.    Otherwise denies any fevers, chills, ear pain, sore throat, other constitutional subjective symptoms.      Review of Systems   Constitutional, HEENT, cardiovascular, pulmonary, gi and gu systems are negative, except as otherwise noted.      Objective    /79   Pulse 64   Temp 98.4  F (36.9  C) (Oral)   Resp 16   Wt 68 kg (150 lb)   LMP 01/18/2015 (Approximate)   SpO2 98%   BMI 26.40 kg/m    Body mass index is 26.4 kg/m .  Physical Exam   GENERAL: healthy, alert and no distress  EYES: Eyes grossly normal to inspection, PERRL and conjunctivae and sclerae normal  HENT: ear canals and TM's normal, some post-nasal drip and nasal congestion, nose and mouth without ulcers or lesions  NECK: no adenopathy, no asymmetry, masses, or scars and thyroid normal to palpation  RESP: lungs clear to auscultation - no rales, rhonchi or wheezes  CV: regular rate and rhythm, normal S1 S2, no S3 or S4, no murmur, click or rub, no peripheral edema and peripheral pulses strong  ABDOMEN: soft, nontender, no hepatosplenomegaly, no masses and  bowel sounds normal  MS: no gross musculoskeletal defects noted, no edema  SKIN: no suspicious lesions or rashes  NEURO: Normal strength and tone, mentation intact and speech normal  PSYCH: mentation appears normal, affect normal/bright    ----- Service Performed and Documented by Resident or Fellow ------

## 2023-06-28 NOTE — PROGRESS NOTES
Preceptor Attestation:    I discussed the patient with the resident and evaluated the patient in person. I have verified the content of the note, which accurately reflects my assessment of the patient and the plan of care.   Supervising Physician:  Sean Rangel DO.

## 2023-07-28 DIAGNOSIS — Z00.00 ROUTINE GENERAL MEDICAL EXAMINATION AT A HEALTH CARE FACILITY: Primary | ICD-10-CM

## 2023-07-28 RX ORDER — PSEUDOEPHED/ACETAMINOPH/DIPHEN 30MG-500MG
TABLET ORAL
Qty: 60 TABLET | Refills: 1 | Status: SHIPPED | OUTPATIENT
Start: 2023-07-28 | End: 2023-10-11

## 2023-10-11 ENCOUNTER — OFFICE VISIT (OUTPATIENT)
Dept: FAMILY MEDICINE | Facility: CLINIC | Age: 53
End: 2023-10-11
Payer: COMMERCIAL

## 2023-10-11 VITALS
OXYGEN SATURATION: 98 % | DIASTOLIC BLOOD PRESSURE: 72 MMHG | BODY MASS INDEX: 27.5 KG/M2 | RESPIRATION RATE: 20 BRPM | SYSTOLIC BLOOD PRESSURE: 115 MMHG | HEIGHT: 63 IN | WEIGHT: 155.2 LBS | TEMPERATURE: 98.1 F | HEART RATE: 68 BPM

## 2023-10-11 DIAGNOSIS — H10.13 ALLERGIC CONJUNCTIVITIS, BILATERAL: ICD-10-CM

## 2023-10-11 DIAGNOSIS — R14.3 FLATULENCE, ERUCTATION AND GAS PAIN: ICD-10-CM

## 2023-10-11 DIAGNOSIS — L28.0 LICHENIFICATION: ICD-10-CM

## 2023-10-11 DIAGNOSIS — K21.00 GASTROESOPHAGEAL REFLUX DISEASE WITH ESOPHAGITIS, UNSPECIFIED WHETHER HEMORRHAGE: ICD-10-CM

## 2023-10-11 DIAGNOSIS — R09.81 NASAL CONGESTION: ICD-10-CM

## 2023-10-11 DIAGNOSIS — R14.2 FLATULENCE, ERUCTATION AND GAS PAIN: ICD-10-CM

## 2023-10-11 DIAGNOSIS — Z00.00 ROUTINE GENERAL MEDICAL EXAMINATION AT A HEALTH CARE FACILITY: ICD-10-CM

## 2023-10-11 DIAGNOSIS — R14.1 FLATULENCE, ERUCTATION AND GAS PAIN: ICD-10-CM

## 2023-10-11 DIAGNOSIS — Z23 NEED FOR PROPHYLACTIC VACCINATION AND INOCULATION AGAINST INFLUENZA: Primary | ICD-10-CM

## 2023-10-11 DIAGNOSIS — H04.123 DRY EYES: ICD-10-CM

## 2023-10-11 DIAGNOSIS — G62.9 NEUROPATHY: ICD-10-CM

## 2023-10-11 PROCEDURE — 99214 OFFICE O/P EST MOD 30 MIN: CPT | Mod: 25 | Performed by: STUDENT IN AN ORGANIZED HEALTH CARE EDUCATION/TRAINING PROGRAM

## 2023-10-11 PROCEDURE — 90471 IMMUNIZATION ADMIN: CPT | Performed by: STUDENT IN AN ORGANIZED HEALTH CARE EDUCATION/TRAINING PROGRAM

## 2023-10-11 PROCEDURE — 90686 IIV4 VACC NO PRSV 0.5 ML IM: CPT | Performed by: STUDENT IN AN ORGANIZED HEALTH CARE EDUCATION/TRAINING PROGRAM

## 2023-10-11 RX ORDER — PSEUDOEPHED/ACETAMINOPH/DIPHEN 30MG-500MG
500-1000 TABLET ORAL EVERY 6 HOURS PRN
Qty: 60 TABLET | Refills: 1 | Status: SHIPPED | OUTPATIENT
Start: 2023-10-11 | End: 2024-04-30

## 2023-10-11 RX ORDER — GABAPENTIN 300 MG/1
300 CAPSULE ORAL 2 TIMES DAILY
Qty: 180 CAPSULE | Refills: 3 | Status: CANCELLED | OUTPATIENT
Start: 2023-10-11

## 2023-10-11 RX ORDER — SIMETHICONE 125 MG
125 TABLET,CHEWABLE ORAL 2 TIMES DAILY
Qty: 60 TABLET | Refills: 3 | Status: SHIPPED | OUTPATIENT
Start: 2023-10-11

## 2023-10-11 RX ORDER — CETIRIZINE HYDROCHLORIDE 10 MG/1
10 TABLET ORAL DAILY
Qty: 90 TABLET | Refills: 1 | Status: SHIPPED | OUTPATIENT
Start: 2023-10-11 | End: 2024-04-30

## 2023-10-11 RX ORDER — FLUOCINONIDE 0.5 MG/G
OINTMENT TOPICAL 2 TIMES DAILY
Qty: 60 G | Refills: 1 | Status: SHIPPED | OUTPATIENT
Start: 2023-10-11 | End: 2024-05-21

## 2023-10-11 RX ORDER — FAMOTIDINE 40 MG/1
40 TABLET, FILM COATED ORAL DAILY
Qty: 90 TABLET | Refills: 1 | Status: SHIPPED | OUTPATIENT
Start: 2023-10-11 | End: 2024-05-21

## 2023-10-11 RX ORDER — GABAPENTIN 400 MG/1
400 CAPSULE ORAL 2 TIMES DAILY
Qty: 180 CAPSULE | Refills: 1 | Status: SHIPPED | OUTPATIENT
Start: 2023-10-11 | End: 2024-05-21

## 2023-10-11 NOTE — PROGRESS NOTES
There are no exam notes on file for this visit.    ASSESSMENT AND PLAN:      Po was seen today for other and imm/inj.  We reviewed medications, their indications and prescribed refills today.  Still having trouble with stomache and back, but tolerable.      Diagnoses and all orders for this visit:    Flatulence, eructation and gas pain  Simethicone has worked well.  Refilled.   -     simethicone (MYLICON) 125 MG chewable tablet; Take 1 tablet (125 mg) by mouth 2 times daily    Seasonal Allergies  Dry eyes  Nasal congestion  -     ketotifen fumarate 0.035%, ketotifen 0.025%, (ZADITOR) 0.025 % ophthalmic solution; Place 1 drop into both eyes 2 times daily  -     cetirizine (ZYRTEC) 10 MG tablet; Take 1 tablet (10 mg) by mouth daily    Routine general medical examination at a health care facility  -     acetaminophen (TYLENOL) 500 MG tablet; Take 1-2 tablets (500-1,000 mg) by mouth every 6 hours as needed for mild pain    Notalgia Parasthetica  Lichenification  Continue with cream.  Gabapentin also helping.  Stable.    -     fluocinonide (LIDEX) 0.05 % external ointment; Apply topically 2 times daily    Neuropathy  Helping with symptoms.  Now taking regularly, feels dose is good and just wants to increase slightly.    -     gabapentin (NEURONTIN) 400 MG capsule; Take 1 capsule (400 mg) by mouth 2 times daily    Gastroesophageal reflux disease with esophagitis, unspecified whether hemorrhage  Worse with fish paste and sticky rice.    -     famotidine (PEPCID) 40 MG tablet; Take 1 tablet (40 mg) by mouth daily    Need for prophylactic vaccination and inoculation against influenza  -     INFLUENZA VACCINE IM > 6 MONTHS VALENT IIV4 (AFLURIA/FLUZONE)        There are no Patient Instructions on file for this visit.    Nellie Harman MD    SUBJECTIVE  Po Pree is a 53 year old female with past medical history significant for    Patient Active Problem List   Diagnosis    Self-limiting autoimmune thyroiditis with transient  "hyperthyroidism and/or hypothyroidism    Health Care Home    Lump In / On the skin    Vitamin D deficiency    Screening for depression    CTS (carpal tunnel syndrome)    Other chronic nonalcoholic liver disease    Notalgia paresthetica    Lichenification    Dry eyes    Hepatic steatosis    Seasonal allergic rhinitis, unspecified trigger     Others present at the visit:  Sagemarymaicol Culver, present in person    Presents for   Chief Complaint   Patient presents with    Other     Follow-up last visit    Imm/Inj     Flu Shot     Patient presents today with patient presents for follow-up of a couple of issues.  She continues to notice itching and burning in her mid back.  This is the same, but may be slightly better.  The cream is helpful.  She still feels like there is a crawling sensation under her skin.  Hot water helps with this.    She also continues to have difficulty with symptoms when she eats certain foods.  Is particularly bad with fish paste, or sticky rice.  Does not feel that the pills of been helpful for this.    We reviewed her list of medications.  She is currently taking the gabapentin twice daily regularly and everything else as needed.  Reviewed the use of eyedrops, cetirizine for allergies, and Pepcid.  She would like refills on all of these medications today.    Continues to have some difficulty with gas pain in her stomach, but the simethicone helps with this.    Otherwise she is feeling well.  Has already gotten her flu shot.  Declines COVID-19 vaccine today    OBJECTIVE:  Vitals: /72   Pulse 68   Temp 98.1  F (36.7  C) (Oral)   Resp 20   Ht 1.605 m (5' 3.2\")   Wt 70.4 kg (155 lb 3.2 oz)   LMP 01/18/2015 (Approximate)   SpO2 98%   BMI 27.32 kg/m    BMI= Body mass index is 27.32 kg/m .  Objective:    Vitals:  Vitals are reviewed and are within the normal range  Gen:  Alert, pleasant, no acute distress  Cardiac:  Regular rate and rhythm, no murmurs, rubs or gallops  Respiratory:  Lungs " clear to auscultation bilaterally  Back:  chronic erythema, excoriation and skin changes beneath right shoulder blade and on right lower back.    Abdomen:  Soft, non-tender, non-distended, bowel sounds positive    No results found for any visits on 10/11/23.        There are no Patient Instructions on file for this visit.    Nellie Harman MD

## 2023-11-27 NOTE — RESULT ENCOUNTER NOTE
Po Pree-    Here is a copy of your lab results.  Overall, they look good.  Your thyroid testing is normal.  Your cholesterol levels are excellent.  Your liver tests however are a little high.  We'll talk about this at follow up and make sure we have a plan.  Please call the clinic at [phone] if you have any questions.      Nellie Harman MD    Please send results to patient.     normal/cranial nerves II-XII intact/sensation intact details…

## 2024-01-29 ENCOUNTER — TELEPHONE (OUTPATIENT)
Dept: FAMILY MEDICINE | Facility: CLINIC | Age: 54
End: 2024-01-29
Payer: COMMERCIAL

## 2024-01-29 NOTE — TELEPHONE ENCOUNTER
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    LUISA Jeffery

## 2024-04-30 ENCOUNTER — OFFICE VISIT (OUTPATIENT)
Dept: FAMILY MEDICINE | Facility: CLINIC | Age: 54
End: 2024-04-30
Payer: COMMERCIAL

## 2024-04-30 VITALS
TEMPERATURE: 97.9 F | BODY MASS INDEX: 28.09 KG/M2 | HEART RATE: 89 BPM | RESPIRATION RATE: 16 BRPM | SYSTOLIC BLOOD PRESSURE: 129 MMHG | DIASTOLIC BLOOD PRESSURE: 75 MMHG | OXYGEN SATURATION: 97 % | WEIGHT: 159.6 LBS

## 2024-04-30 DIAGNOSIS — R20.2 NOTALGIA PARESTHETICA: Primary | ICD-10-CM

## 2024-04-30 DIAGNOSIS — L98.9 SKIN LESION: ICD-10-CM

## 2024-04-30 DIAGNOSIS — Z13.1 SCREENING FOR DIABETES MELLITUS: ICD-10-CM

## 2024-04-30 DIAGNOSIS — R09.89 THROAT CLEARING: ICD-10-CM

## 2024-04-30 LAB
HBA1C MFR BLD: 5.6 % (ref 0–5.6)
TSH SERPL DL<=0.005 MIU/L-ACNC: 0.57 UIU/ML (ref 0.3–4.2)

## 2024-04-30 PROCEDURE — 83036 HEMOGLOBIN GLYCOSYLATED A1C: CPT

## 2024-04-30 PROCEDURE — 36415 COLL VENOUS BLD VENIPUNCTURE: CPT

## 2024-04-30 PROCEDURE — 99207 E-CONSULT TO DERMATOLOGY (ADULT OUTPT PROVIDER TO SPECIALIST WRITTEN QUESTION & RESPONSE): CPT

## 2024-04-30 PROCEDURE — 84443 ASSAY THYROID STIM HORMONE: CPT

## 2024-04-30 PROCEDURE — 99214 OFFICE O/P EST MOD 30 MIN: CPT | Mod: GC

## 2024-04-30 RX ORDER — PSEUDOEPHED/ACETAMINOPH/DIPHEN 30MG-500MG
500-1000 TABLET ORAL EVERY 6 HOURS PRN
Qty: 60 TABLET | Refills: 1 | Status: SHIPPED | OUTPATIENT
Start: 2024-04-30

## 2024-04-30 RX ORDER — CETIRIZINE HYDROCHLORIDE 10 MG/1
10 TABLET ORAL DAILY
Qty: 90 TABLET | Refills: 1 | Status: SHIPPED | OUTPATIENT
Start: 2024-04-30 | End: 2024-05-21

## 2024-04-30 RX ORDER — FLUTICASONE PROPIONATE 50 MCG
1 SPRAY, SUSPENSION (ML) NASAL DAILY
Qty: 15.8 ML | Refills: 3 | Status: SHIPPED | OUTPATIENT
Start: 2024-04-30 | End: 2024-05-21

## 2024-04-30 NOTE — PROGRESS NOTES
Assessment & Plan     Notalgia paresthetica  Skin lesion  Has had a few weeks of worsening right upper back pruritus and erythema overlying her known notalgia paresthetica lesion.  No recent change in her gabapentin dose which has helped some.  She has been using her Lidex ointment with some relief in symptoms however also she has been using hot water and a glass of water bottle daily for the last few weeks in that area to help with the pruritus.  She has noticed increased erythema in this time and the pruritus continues to be severe. On exam she has a roughly 12x10 cm generally well-circumscribed erythematous patch with scattered fine scaling on right upper back, lichenification medially with hyperpigmentation, and mild excoriations superior and lateral to patch.  Suspect erythematous patch is due to heat however question diagnosis with scaling noted and completed E dermatology consult for further recommendations.  Recommended patient decrease amount of heat that she is applying to her back.  Deferred prescribing additional topical medication at this time given unknown etiology of skin changes.  Will contact patient with update pending E consult.  - acetaminophen (TYLENOL) 500 MG tablet  Dispense: 60 tablet; Refill: 1  - cetirizine (ZYRTEC) 10 MG tablet  Dispense: 90 tablet; Refill: 1    Throat clearing  Has had a few days of ongoing right-sided throat itching and tingling sensation and does endorse postnasal drip and frequent throat clearing.  Suspect symptoms are related to allergies and will restart Flonase today in addition to Zyrtec given pruritus, as above.  Per chart review also does a history of thyroiditis and will recheck thyroid function again today however exam of thyroid unremarkable.  - acetaminophen (TYLENOL) 500 MG tablet  Dispense: 60 tablet; Refill: 1  - fluticasone (FLONASE) 50 MCG/ACT nasal spray  Dispense: 15.8 mL; Refill: 3  - TSH with free T4 reflex  - cetirizine (ZYRTEC) 10 MG tablet   "Dispense: 90 tablet; Refill: 1    Screening for diabetes mellitus  Screening for diabetes today.  Prior labs including A1c many years ago and more recent CMP/BMPs have been reassuring.  Does have a history of hepatic steatosis.  - Hemoglobin A1c      Return in about 1 week (around 5/7/2024) for Follow up, with any available provider.    Katja   Po is a 54 year old, presenting for the following health issues:  Other (Burning/ itchy on back /Sore throat)    HPI     Presents with worsening burning sensation and pruritus on her right upper back in addition to concerns about itchy throat and desire to be screened for diabetes.    Patient shares that she has had increased burning sensation and pruritus of her right upper back in the last few weeks.  Patient has long history of notalgia paresthetica however she states that she has had increased intensity of the \"deep itch\" of her right upper back compared to her baseline.  She continues to take gabapentin which helps some with her symptoms.  No recent changes in gabapentin dose.  Patient has been using hot water and a water bottle against her skin as a source of heat to help with the sensation.  She does state that the heat helps improve her symptoms however she has noticed increased erythema in that area as a result.  Patient uses the source of heat daily and has done so for at least the last few weeks.  Patient also continues to apply    Regarding her throat with the patient states she has right-sided itchy sensation and a \"tickling\" for the past 4 days.  She also endorses sometimes having postnasal drip sensation and often is clearing her throat.  She states she sometimes coughs up clear yellow phlegm.  She does endorse reflux like symptoms as well.  She previously has been on Flonase, Zyrtec, and allergy eyedrops however she states she is not been taking these for a while because her allergy symptoms had improved.    Patient would like to be screened for " diabetes.      Objective    /75   Pulse 89   Temp 97.9  F (36.6  C) (Oral)   Resp 16   Wt 72.4 kg (159 lb 9.6 oz)   LMP 01/18/2015 (Approximate)   SpO2 97%   BMI 28.09 kg/m    Body mass index is 28.09 kg/m .  Physical Exam   Constitutional: healthy, alert, no distress, and cooperative  Head: normocephalic  Neck: Thyroid not enlarged, no tenderness palpation of thyroid  ENT: Posterior pharynx without significant erythema, moderately enlarged tonsils  Cardiovascular: appears well perfused  Respiratory: breathing comfortably on RA  Musculoskeletal: extremities normal- no gross deformities noted, gait normal  Skin: Roughly 12 cm generally well-circumscribed erythematous patch with scattered fine scaling on right upper back, lichenification medially with hyperpigmentation, mild excoriations superior and lateral to patch, not warm to touch  Neurologic: grossly normal CN  Psychiatric: mentation appears normal and affect normal                            Signed Electronically by: Traci Medeiros MD

## 2024-04-30 NOTE — PROGRESS NOTES
Preceptor Attestation:    I discussed the patient with the resident and evaluated the patient in person. I have verified the content of the note, which accurately reflects my assessment of the patient and the plan of care.    Would like to trial lidocaine or capsaicin cream for treatment of itching with notalgia paresthetica, but not with damaged skin in the area of heating.  Will wait for skin to heal and then attempt treatment with capsaicin or lidocaine.     Supervising Physician:  Gloria Bautista MD

## 2024-04-30 NOTE — NURSING NOTE
Due to patient being non-English speaking/uses sign language, an  was used for this visit. Only for face-to-face interpretation by an external agency, date and length of interpretation can be found on the scanned worksheet.     name: Josh Mendoza  Agency: [employer]  Language: Danna   Telephone number: [phone]  Type of interpretation: Face-to-face, spoken

## 2024-05-07 ENCOUNTER — E-CONSULT (OUTPATIENT)
Dept: DERMATOLOGY | Facility: CLINIC | Age: 54
End: 2024-05-07
Payer: COMMERCIAL

## 2024-05-07 PROCEDURE — 99451 NTRPROF PH1/NTRNET/EHR 5/>: CPT | Performed by: DERMATOLOGY

## 2024-05-07 NOTE — PROGRESS NOTES
5/7/2024     E-Consult has been accepted.    Interprofessional consultation requested by:  Gloria Bautista MD      Clinical Question/Purpose: MY CLINICAL QUESTION IS: Appreciate recommendations regarding etiology of worsening erythematous patch and potential topical treatment to relieve patient's pruritus?  She presents with a few weeks of worsening pruritus and erythema involving her right upper back and has been using hot water in a glass water bottle against her skin once daily for a few weeks for relief of pruritus with subsequent development of erythema overlying chronic notalgia paresthetica lesion.  She also has used Lidex ointment typically twice daily with some relief in pruritus.  No other known therapies or other topicals have been applied.  On exam she has a roughly 12x10 cm generally well-circumscribed erythematous patch with scattered fine scaling on right upper back, lichenification medially with hyperpigmentation, and mild excoriations superior and lateral to patch.  Suspect erythematous patch is due to heat however question diagnosis with scaling noted. Thank you!    Patient assessment and information reviewed: notes and photos    Recommendations:   The eruption appears consistent with a lichenified eczematous dermatitis. Allergic contact dermatitis is a strong consideration. Recommend uptitration of topical steroid to augmented betamethasone 0.05% ointment BID under occlusion. If this does not lead to improvement, consider switching to an alternative topical corticosteroid class, such as desoximetasone 0.25% ointment BID. This may require a prior authorization. Consider increasing adjunctive antihistamines to cetirizine 10 mg BID.      The recommendations provided in this E-Consult are based on a review of clinical data pertinent to the clinical question presented, without a review of the patient's complete medical record or, the benefit of a comprehensive in-person or virtual patient evaluation. This  consultation should not replace the clinical judgement and evaluation of the provider ordering this E-Consult. Any new clinical issues, or changes in patient status since the filing of this E-Consult will need to be taken into account when assessing these recommendations. Please contact me if you have further questions.    My total time spent reviewing clinical information and formulating assessment was 5 minutes.    Isidoro Peter MD, FAAD   of Dermatology  Department of Dermatology  [facility]

## 2024-05-08 ENCOUNTER — PATIENT OUTREACH (OUTPATIENT)
Dept: CARE COORDINATION | Facility: CLINIC | Age: 54
End: 2024-05-08
Payer: COMMERCIAL

## 2024-05-08 DIAGNOSIS — L30.9 ECZEMA, UNSPECIFIED TYPE: Primary | ICD-10-CM

## 2024-05-08 RX ORDER — BETAMETHASONE DIPROPIONATE 0.05 %
OINTMENT (GRAM) TOPICAL 2 TIMES DAILY
Qty: 50 G | Refills: 0 | Status: SHIPPED | OUTPATIENT
Start: 2024-05-08 | End: 2024-05-21

## 2024-05-08 NOTE — PROGRESS NOTES
Care Coordination:     DME Order Number:  [id number]  Device: [employer]  Vendor: [employer]  Fax: [phone]      Adolfo Richardson Sr.   Care Coordination  [address] [address]  [email or web address]   Office: [phone] Direct: [phone]  [facility]

## 2024-05-08 NOTE — Clinical Note
Hi Dr. Harman, I recently saw Po and had sent an E Derm consult given the inflammation and scaling on her back with worse pruritus from baseline.  See Derm E- consult for recommendation.  I sent prescription for the recommended ointment and a DME order (requested Adolfo to fax to have mailed to patient) as recommended.  She is scheduled to see you on 5/21/2024 and I asked the  to reach out to her to have her seen 1 time in between just to make sure that she clinically is improving and knows how to apply the ointment and the dressing.  Thank you! Traci

## 2024-05-08 NOTE — PROGRESS NOTES
Called and discussed Derm E-consult recommendations with patient with Danna . Symptoms slightly improved from recent visit. Will send ointment to pharmacy and DME order for occlusive dressing.  to help schedule follow up appt in next week if able for follow up. Also scheduled to see PCP on 5/21/24.    Traci Medeiros MD

## 2024-05-21 ENCOUNTER — OFFICE VISIT (OUTPATIENT)
Dept: FAMILY MEDICINE | Facility: CLINIC | Age: 54
End: 2024-05-21
Payer: COMMERCIAL

## 2024-05-21 VITALS
DIASTOLIC BLOOD PRESSURE: 76 MMHG | WEIGHT: 161.4 LBS | BODY MASS INDEX: 28.6 KG/M2 | OXYGEN SATURATION: 96 % | HEIGHT: 63 IN | TEMPERATURE: 98.7 F | HEART RATE: 73 BPM | RESPIRATION RATE: 18 BRPM | SYSTOLIC BLOOD PRESSURE: 118 MMHG

## 2024-05-21 DIAGNOSIS — K21.00 GASTROESOPHAGEAL REFLUX DISEASE WITH ESOPHAGITIS, UNSPECIFIED WHETHER HEMORRHAGE: ICD-10-CM

## 2024-05-21 DIAGNOSIS — R20.2 NOTALGIA PARESTHETICA: Primary | ICD-10-CM

## 2024-05-21 DIAGNOSIS — R09.89 THROAT CLEARING: ICD-10-CM

## 2024-05-21 DIAGNOSIS — G62.9 NEUROPATHY: ICD-10-CM

## 2024-05-21 DIAGNOSIS — L30.9 ECZEMA, UNSPECIFIED TYPE: ICD-10-CM

## 2024-05-21 DIAGNOSIS — J30.2 SEASONAL ALLERGIC RHINITIS, UNSPECIFIED TRIGGER: ICD-10-CM

## 2024-05-21 PROCEDURE — 99214 OFFICE O/P EST MOD 30 MIN: CPT | Performed by: STUDENT IN AN ORGANIZED HEALTH CARE EDUCATION/TRAINING PROGRAM

## 2024-05-21 RX ORDER — CETIRIZINE HYDROCHLORIDE 10 MG/1
10 TABLET ORAL DAILY
Qty: 90 TABLET | Refills: 1 | Status: SHIPPED | OUTPATIENT
Start: 2024-05-21

## 2024-05-21 RX ORDER — BETAMETHASONE DIPROPIONATE 0.05 %
OINTMENT (GRAM) TOPICAL 2 TIMES DAILY
Qty: 50 G | Refills: 0 | Status: SHIPPED | OUTPATIENT
Start: 2024-05-21

## 2024-05-21 RX ORDER — FLUTICASONE PROPIONATE 50 MCG
1 SPRAY, SUSPENSION (ML) NASAL DAILY
Qty: 15.8 ML | Refills: 3 | Status: SHIPPED | OUTPATIENT
Start: 2024-05-21

## 2024-05-21 RX ORDER — GABAPENTIN 400 MG/1
400 CAPSULE ORAL 2 TIMES DAILY
Qty: 180 CAPSULE | Refills: 1 | Status: SHIPPED | OUTPATIENT
Start: 2024-05-21

## 2024-05-21 RX ORDER — OLOPATADINE HYDROCHLORIDE 1 MG/ML
1 SOLUTION/ DROPS OPHTHALMIC 2 TIMES DAILY
Qty: 5 ML | Refills: 2 | Status: SHIPPED | OUTPATIENT
Start: 2024-05-21

## 2024-05-21 RX ORDER — AZELASTINE 1 MG/ML
1 SPRAY, METERED NASAL 2 TIMES DAILY
Qty: 30 ML | Refills: 3 | Status: SHIPPED | OUTPATIENT
Start: 2024-05-21

## 2024-05-21 RX ORDER — FAMOTIDINE 40 MG/1
40 TABLET, FILM COATED ORAL DAILY
Qty: 90 TABLET | Refills: 1 | Status: SHIPPED | OUTPATIENT
Start: 2024-05-21

## 2024-05-21 NOTE — PATIENT INSTRUCTIONS
Thank you for coming in today    Refills at the pharmacy!    New nose spray:  Azelastine nasal spray  New eye drop: Patanol    Refill allergy pill, cream, gabapentin and gas pills.

## 2024-05-21 NOTE — PROGRESS NOTES
There are no exam notes on file for this visit.    ASSESSMENT AND PLAN:      Po was seen today for pain.    Diagnoses and all orders for this visit:    Notalgia paresthetica  Eczema, unspecified type  Overlying rash has improved.  Still has chronic itching, but the gabapentin and Zyrtec are helpful.  Refills were provided today.  -     betamethasone dipropionate (DIPROSONE) 0.05 % external ointment; Apply topically 2 times daily  -     cetirizine (ZYRTEC) 10 MG tablet; Take 1 tablet (10 mg) by mouth daily  -     gabapentin (NEURONTIN) 400 MG capsule; Take 1 capsule (400 mg) by mouth 2 times daily    Throat clearing  Seasonal allergic rhinitis, unspecified trigger  Continued postnasal drip, worsening allergy symptoms, and throat clearing.  We refilled her Flonase and Zyrtec.  Will add azelastine nasal spray and Patanol drops as well.  Discussed trying a HEPA filter or closing the windows at least in the bedroom during this time of year to improve symptoms.  -     azelastine (ASTELIN) 0.1 % nasal spray; Spray 1 spray into both nostrils 2 times daily  -     olopatadine (PATANOL) 0.1 % ophthalmic solution; Place 1 drop into both eyes 2 times daily  -     fluticasone (FLONASE) 50 MCG/ACT nasal spray; Spray 1 spray into both nostrils daily  -     cetirizine (ZYRTEC) 10 MG tablet; Take 1 tablet (10 mg) by mouth daily    Throat clearing  Gastroesophageal reflux disease with esophagitis, unspecified whether hemorrhage  Refilled GERD medications as this could be contributing to throat clearing symptoms.  -     famotidine (PEPCID) 40 MG tablet; Take 1 tablet (40 mg) by mouth daily    Follow-up in 6 to 8 weeks.      Patient Instructions   Thank you for coming in today    Refills at the pharmacy!    New nose spray:  Azelastine nasal spray  New eye drop: Patanol    Refill allergy pill, cream, gabapentin and gas pills.      Nellie Harman MD    SUBJECTIVE  Po Klaus is a 54 year old female with past medical history significant  "for    Patient Active Problem List   Diagnosis    Self-limiting autoimmune thyroiditis with transient hyperthyroidism and/or hypothyroidism    Health Care Home    Lump In / On the skin    Vitamin D deficiency    Screening for depression    CTS (carpal tunnel syndrome)    Other chronic nonalcoholic liver disease    Notalgia paresthetica    Lichenification    Dry eyes    Hepatic steatosis    Seasonal allergic rhinitis, unspecified trigger     Others present at the visit:   Colton Boyd, present in person    Presents for   Chief Complaint   Patient presents with    Pain     Follow-up right back and shoulder.  Itchy, burning, and pain     Patient presents today for evaluation of her chronic itching and pain secondary to notalgia paresthetica and overlying eczematous changes and lichenification due to chronic itching.  Her symptoms are much better.  She feels like the new cream is working well.  Still needs to apply regularly.  The itching pill has also been helpful and so she is scratching less, especially at nighttime.  Still bothers her on a daily basis.    Additionally, Patient continues to have difficulty with allergy symptoms.  This is gotten acutely worse in the last week.  Typically has more trouble in the springtime.  She has been using Zyrtec, and Flonase and it has been helpful.  Still has lots of postnasal drip and throat clearing difficulties.  Also noticing some itching and tearing of her eyes that is bothersome.    OBJECTIVE:  Vitals: /76   Pulse 73   Temp 98.7  F (37.1  C) (Oral)   Resp 18   Ht 1.608 m (5' 3.3\")   Wt 73.2 kg (161 lb 6.4 oz)   LMP 01/18/2015 (Approximate)   SpO2 96%   BMI 28.32 kg/m    BMI= Body mass index is 28.32 kg/m .  Objective:    Vitals:  Vitals are reviewed and are within the normal range  Gen:  Alert, pleasant, no acute distress  Head:  Normal cephalic, atraumatic  Ears:  Tympanic membranes viewed bilaterally, no erythema, bulging, or fluid present  Sinus: " No frontal or maxillary sinus tenderness.  Moderate congestion with enlarged turbinates bilaterally.  Throat:  Clear.  Non-erythematous and without exudate  Neck: Bilateral nontender cervical lymphadenopathy  Cardiac:  Regular rate and rhythm, no murmurs, rubs or gallops  Respiratory:  Lungs clear to auscultation bilaterally  Back: She has a palm sized thickened hyperpigmented lesion on her right scapular area, consistent with previous notalgia paresthetica.  There is no skin breakdown.  No active erythema, and it looks much improved  Abdomen:  Soft, non-tender, non-distended, bowel sounds positive  Extremities:  Warm, well-perfused, pulses 2+/4, no lower extremity edema  Skin:  Mucous membranes moist.  No rash.   Capillary refill <2 secs.        No results found for any visits on 05/21/24.        Patient Instructions   Thank you for coming in today    Refills at the pharmacy!    New nose spray:  Azelastine nasal spray  New eye drop: Patanol    Refill allergy pill, cream, gabapentin and gas pills.      Nellie Harman MD

## 2024-09-18 ENCOUNTER — OFFICE VISIT (OUTPATIENT)
Dept: FAMILY MEDICINE | Facility: CLINIC | Age: 54
End: 2024-09-18
Payer: COMMERCIAL

## 2024-09-18 VITALS
SYSTOLIC BLOOD PRESSURE: 136 MMHG | OXYGEN SATURATION: 98 % | HEIGHT: 62 IN | HEART RATE: 83 BPM | RESPIRATION RATE: 16 BRPM | BODY MASS INDEX: 29.63 KG/M2 | WEIGHT: 161 LBS | TEMPERATURE: 98.3 F | DIASTOLIC BLOOD PRESSURE: 78 MMHG

## 2024-09-18 DIAGNOSIS — R10.13 ABDOMINAL PAIN, EPIGASTRIC: Primary | ICD-10-CM

## 2024-09-18 DIAGNOSIS — Z12.11 ENCOUNTER FOR COLORECTAL CANCER SCREENING: ICD-10-CM

## 2024-09-18 DIAGNOSIS — Z12.12 ENCOUNTER FOR COLORECTAL CANCER SCREENING: ICD-10-CM

## 2024-09-18 LAB
BASOPHILS # BLD AUTO: 0 10E3/UL (ref 0–0.2)
BASOPHILS NFR BLD AUTO: 0 %
EOSINOPHIL # BLD AUTO: 0.1 10E3/UL (ref 0–0.7)
EOSINOPHIL NFR BLD AUTO: 2 %
ERYTHROCYTE [DISTWIDTH] IN BLOOD BY AUTOMATED COUNT: 12.3 % (ref 10–15)
HCT VFR BLD AUTO: 39.8 % (ref 35–47)
HGB BLD-MCNC: 13.1 G/DL (ref 11.7–15.7)
IMM GRANULOCYTES # BLD: 0 10E3/UL
IMM GRANULOCYTES NFR BLD: 0 %
LYMPHOCYTES # BLD AUTO: 1.5 10E3/UL (ref 0.8–5.3)
LYMPHOCYTES NFR BLD AUTO: 22 %
MCH RBC QN AUTO: 31.3 PG (ref 26.5–33)
MCHC RBC AUTO-ENTMCNC: 32.9 G/DL (ref 31.5–36.5)
MCV RBC AUTO: 95 FL (ref 78–100)
MONOCYTES # BLD AUTO: 0.9 10E3/UL (ref 0–1.3)
MONOCYTES NFR BLD AUTO: 13 %
NEUTROPHILS # BLD AUTO: 4.3 10E3/UL (ref 1.6–8.3)
NEUTROPHILS NFR BLD AUTO: 63 %
PLATELET # BLD AUTO: 239 10E3/UL (ref 150–450)
RBC # BLD AUTO: 4.19 10E6/UL (ref 3.8–5.2)
WBC # BLD AUTO: 6.9 10E3/UL (ref 4–11)

## 2024-09-18 PROCEDURE — 36415 COLL VENOUS BLD VENIPUNCTURE: CPT

## 2024-09-18 PROCEDURE — 85025 COMPLETE CBC W/AUTO DIFF WBC: CPT

## 2024-09-18 PROCEDURE — 80053 COMPREHEN METABOLIC PANEL: CPT

## 2024-09-18 PROCEDURE — 99213 OFFICE O/P EST LOW 20 MIN: CPT

## 2024-09-18 NOTE — PROGRESS NOTES
Preceptor Attestation:    I discussed the patient with the resident and evaluated the patient in person. I have verified the content of the note, which accurately reflects my assessment of the patient and the plan of care.   Supervising Physician:  Burt Bailey MD.

## 2024-09-19 LAB
ALBUMIN SERPL BCG-MCNC: 3.9 G/DL (ref 3.5–5.2)
ALP SERPL-CCNC: 130 U/L (ref 40–150)
ALT SERPL W P-5'-P-CCNC: 21 U/L (ref 0–50)
ANION GAP SERPL CALCULATED.3IONS-SCNC: 9 MMOL/L (ref 7–15)
AST SERPL W P-5'-P-CCNC: 41 U/L (ref 0–45)
BILIRUB SERPL-MCNC: 0.3 MG/DL
BUN SERPL-MCNC: 8.8 MG/DL (ref 6–20)
CALCIUM SERPL-MCNC: 9.3 MG/DL (ref 8.8–10.4)
CHLORIDE SERPL-SCNC: 106 MMOL/L (ref 98–107)
CREAT SERPL-MCNC: 0.79 MG/DL (ref 0.51–0.95)
EGFRCR SERPLBLD CKD-EPI 2021: 87 ML/MIN/1.73M2
GLUCOSE SERPL-MCNC: 109 MG/DL (ref 70–99)
HCO3 SERPL-SCNC: 26 MMOL/L (ref 22–29)
POTASSIUM SERPL-SCNC: 3.6 MMOL/L (ref 3.4–5.3)
PROT SERPL-MCNC: 7 G/DL (ref 6.4–8.3)
SODIUM SERPL-SCNC: 141 MMOL/L (ref 135–145)

## 2024-09-20 PROCEDURE — 82274 ASSAY TEST FOR BLOOD FECAL: CPT

## 2024-09-22 NOTE — PROGRESS NOTES
"  Assessment & Plan     Abdominal pain, epigastric  Return visit for chronic RUQ pain that pt describes as \"gas pains and pressure\" on her right side that extends into her upper back and most prominent in the morning while doing housework. Pt denied recent injury, repetitive activities such as lifting, epigastric/abdominal burning sensation, diaphoresis, and extension into the L side. H/o of these symptoms and conditions extends back to 2013 with the most recent limited abdominal US on 3/7/2023, showing mild hepatic steatosis without focal hepatic mass or biliary dilatation and normal appearance of the gallbladder without ascites. Physical exam notable for RUQ tenderness to palpation without rebound tenderness or guarding. Repeat labs reassuringly normal without leukocytosis and normal alk phos, AST, ALT, and total bili. Given persistent symptoms and last imaging over one year ago, refer to repeat limited abdominal US. Ddx considered at this time somatic symptom disorder vs hepatic change but less likely due to normal liver transaminases.   - CBC with Platelets & Differential  - Comprehensive metabolic panel  - US Abdomen Limited; Future    Encounter for colorectal cancer screening  Pt agreeable to having stool FIT testing. Ordered kit for take home.     BMI  Estimated body mass index is 29.07 kg/m  as calculated from the following:    Height as of this encounter: 1.585 m (5' 2.4\").    Weight as of this encounter: 73 kg (161 lb).       No follow-ups on file.    Subjective   Po is a 54 year old, presenting for the following health issues:  Pain (Having pain on the right chest to the back since yesterday and it is hurt when breathing)    HPI   Pt returns to clinic today to address right-sided pain localized to RUQ intermittently but most notable becoming increasingly uncomfortable yesterday. Pt described the pain as extending into her upper back without extension into the L side and similar to \"gas pains and " "pressure\". Pt denied recent injury, repetitive activity such as lifting and bending, burning sensation, diaphoresis, generalized chest pain, n/v, changes in bowel habits, and fever.        Review of Systems  Constitutional, HEENT, cardiovascular, pulmonary, gi and gu systems are negative, except as otherwise noted.      Objective    /78 (BP Location: Left arm, Patient Position: Sitting, Cuff Size: Adult Regular)   Pulse 83   Temp 98.3  F (36.8  C) (Oral)   Resp 16   Ht 1.585 m (5' 2.4\")   Wt 73 kg (161 lb)   LMP 01/18/2015 (Approximate)   SpO2 98%   BMI 29.07 kg/m    Body mass index is 29.07 kg/m .  Physical Exam   GENERAL: alert, well-appearing, no distress.   RESP: lungs clear to auscultation - no rales, rhonchi or wheezes  CV: regular rate and rhythm, normal S1 S2, no S3 or S4, no murmur, click or rub, no peripheral edema  ABDOMEN: soft, mild RUQ tenderness to palpation without guarding nor rebounding, no hepatosplenomegaly, no masses and bowel sounds normal  MS: no gross musculoskeletal defects noted, no edema  NEURO: no focal deficits          This patient precepted with Dr. Burt Bailey MD.   Signed Electronically by: VALERIE RECINOS MD ANNI, PGY-2  "

## 2024-09-24 LAB — HEMOCCULT STL QL IA: NEGATIVE

## 2024-09-30 NOTE — RESULT ENCOUNTER NOTE
Assisted by [facility] Danna . LVM for pt to inform of normal results, including stool sample.     Octavio Medellin  Resident Physician, PGY-2

## 2024-10-07 ENCOUNTER — ANCILLARY PROCEDURE (OUTPATIENT)
Dept: ULTRASOUND IMAGING | Facility: CLINIC | Age: 54
End: 2024-10-07
Attending: FAMILY MEDICINE
Payer: COMMERCIAL

## 2024-10-07 DIAGNOSIS — R10.13 ABDOMINAL PAIN, EPIGASTRIC: ICD-10-CM

## 2024-10-07 PROCEDURE — 76705 ECHO EXAM OF ABDOMEN: CPT | Mod: TC | Performed by: RADIOLOGY

## 2024-10-07 NOTE — NURSING NOTE
Due to patient being non-English speaking/uses sign language, an  was used for this visit. Only for face-to-face interpretation by an external agency, date and length of interpretation can be found on the scanned worksheet.     name: Lindy Perdomo  Agency: [employer]  Language: Danna   Telephone number: .  Type of interpretation: Face-to-face, spoken

## 2025-01-22 ENCOUNTER — PATIENT OUTREACH (OUTPATIENT)
Dept: CARE COORDINATION | Facility: CLINIC | Age: 55
End: 2025-01-22
Payer: COMMERCIAL